# Patient Record
Sex: FEMALE | Race: WHITE | Employment: FULL TIME | URBAN - METROPOLITAN AREA
[De-identification: names, ages, dates, MRNs, and addresses within clinical notes are randomized per-mention and may not be internally consistent; named-entity substitution may affect disease eponyms.]

---

## 2017-03-22 ENCOUNTER — OFFICE VISIT (OUTPATIENT)
Dept: FAMILY MEDICINE CLINIC | Age: 52
End: 2017-03-22

## 2017-03-22 VITALS
WEIGHT: 215 LBS | RESPIRATION RATE: 20 BRPM | DIASTOLIC BLOOD PRESSURE: 66 MMHG | HEIGHT: 68 IN | HEART RATE: 77 BPM | BODY MASS INDEX: 32.58 KG/M2 | SYSTOLIC BLOOD PRESSURE: 102 MMHG | OXYGEN SATURATION: 96 %

## 2017-03-22 DIAGNOSIS — R05.3 CHRONIC COUGH: Primary | ICD-10-CM

## 2017-03-22 DIAGNOSIS — Z11.59 NEED FOR HEPATITIS C SCREENING TEST: ICD-10-CM

## 2017-03-22 DIAGNOSIS — Z80.0 FH: COLON CANCER: ICD-10-CM

## 2017-03-22 DIAGNOSIS — I10 ESSENTIAL HYPERTENSION: ICD-10-CM

## 2017-03-22 DIAGNOSIS — N60.01 BREAST CYST, RIGHT: ICD-10-CM

## 2017-03-22 DIAGNOSIS — F43.21 GRIEF: ICD-10-CM

## 2017-03-22 DIAGNOSIS — Z80.8 FH: MELANOMA: ICD-10-CM

## 2017-03-22 DIAGNOSIS — E66.9 OBESITY (BMI 30.0-34.9): ICD-10-CM

## 2017-03-22 PROBLEM — E66.811 OBESITY (BMI 30.0-34.9): Status: ACTIVE | Noted: 2017-03-22

## 2017-03-22 PROBLEM — E78.00 PURE HYPERCHOLESTEROLEMIA: Status: ACTIVE | Noted: 2017-03-22

## 2017-03-22 PROBLEM — J45.909 ASTHMA: Status: ACTIVE | Noted: 2017-03-22

## 2017-03-22 PROBLEM — E11.9 TYPE 2 DIABETES MELLITUS WITHOUT COMPLICATION, WITHOUT LONG-TERM CURRENT USE OF INSULIN (HCC): Status: ACTIVE | Noted: 2017-03-22

## 2017-03-22 LAB
ANION GAP SERPL CALCULATED.3IONS-SCNC: 13 MMOL/L (ref 3–16)
BASOPHILS ABSOLUTE: 0.1 K/UL (ref 0–0.2)
BASOPHILS RELATIVE PERCENT: 1.3 %
BUN BLDV-MCNC: 13 MG/DL (ref 7–20)
CALCIUM SERPL-MCNC: 9.8 MG/DL (ref 8.3–10.6)
CHLORIDE BLD-SCNC: 100 MMOL/L (ref 99–110)
CHOLESTEROL, TOTAL: 280 MG/DL (ref 0–199)
CO2: 28 MMOL/L (ref 21–32)
CREAT SERPL-MCNC: 0.8 MG/DL (ref 0.6–1.1)
EOSINOPHILS ABSOLUTE: 0.1 K/UL (ref 0–0.6)
EOSINOPHILS RELATIVE PERCENT: 2 %
ESTIMATED AVERAGE GLUCOSE: 105.4 MG/DL
GFR AFRICAN AMERICAN: >60
GFR NON-AFRICAN AMERICAN: >60
GLUCOSE BLD-MCNC: 87 MG/DL (ref 70–99)
HBA1C MFR BLD: 5.3 %
HCT VFR BLD CALC: 43.7 % (ref 36–48)
HDLC SERPL-MCNC: 80 MG/DL (ref 40–60)
HEMOGLOBIN: 14.4 G/DL (ref 12–16)
HEPATITIS C ANTIBODY INTERPRETATION: NORMAL
LDL CHOLESTEROL CALCULATED: 183 MG/DL
LYMPHOCYTES ABSOLUTE: 1.6 K/UL (ref 1–5.1)
LYMPHOCYTES RELATIVE PERCENT: 31.9 %
MCH RBC QN AUTO: 28.7 PG (ref 26–34)
MCHC RBC AUTO-ENTMCNC: 33 G/DL (ref 31–36)
MCV RBC AUTO: 87 FL (ref 80–100)
MONOCYTES ABSOLUTE: 0.5 K/UL (ref 0–1.3)
MONOCYTES RELATIVE PERCENT: 10.7 %
NEUTROPHILS ABSOLUTE: 2.7 K/UL (ref 1.7–7.7)
NEUTROPHILS RELATIVE PERCENT: 54.1 %
PDW BLD-RTO: 12.4 % (ref 12.4–15.4)
PLATELET # BLD: 251 K/UL (ref 135–450)
PMV BLD AUTO: 9.2 FL (ref 5–10.5)
POTASSIUM SERPL-SCNC: 3.7 MMOL/L (ref 3.5–5.1)
RBC # BLD: 5.02 M/UL (ref 4–5.2)
SEDIMENTATION RATE, ERYTHROCYTE: 25 MM/HR (ref 0–30)
SODIUM BLD-SCNC: 141 MMOL/L (ref 136–145)
TRIGL SERPL-MCNC: 85 MG/DL (ref 0–150)
TSH REFLEX FT4: 2.31 UIU/ML (ref 0.27–4.2)
VLDLC SERPL CALC-MCNC: 17 MG/DL
WBC # BLD: 5.1 K/UL (ref 4–11)

## 2017-03-22 PROCEDURE — 99204 OFFICE O/P NEW MOD 45 MIN: CPT | Performed by: INTERNAL MEDICINE

## 2017-03-22 PROCEDURE — 36415 COLL VENOUS BLD VENIPUNCTURE: CPT | Performed by: INTERNAL MEDICINE

## 2017-03-22 RX ORDER — FLUTICASONE PROPIONATE 50 MCG
SPRAY, SUSPENSION (ML) NASAL
Refills: 10 | COMMUNITY
Start: 2017-02-22 | End: 2018-05-22

## 2017-03-22 RX ORDER — MONTELUKAST SODIUM 10 MG/1
10 TABLET ORAL DAILY
Qty: 30 TABLET | Refills: 3 | Status: SHIPPED | OUTPATIENT
Start: 2017-03-22 | End: 2017-06-28 | Stop reason: SDUPTHER

## 2017-03-22 RX ORDER — LISINOPRIL 20 MG/1
TABLET ORAL
Refills: 0 | COMMUNITY
Start: 2017-03-03 | End: 2017-06-02 | Stop reason: SDUPTHER

## 2017-03-22 RX ORDER — PANTOPRAZOLE SODIUM 40 MG/1
TABLET, DELAYED RELEASE ORAL
Refills: 0 | COMMUNITY
Start: 2017-02-22 | End: 2019-11-26

## 2017-03-22 RX ORDER — ESTRADIOL 1 MG/1
TABLET ORAL
Refills: 0 | COMMUNITY
Start: 2017-01-28 | End: 2017-03-22 | Stop reason: SDUPTHER

## 2017-03-22 RX ORDER — TRIAMTERENE AND HYDROCHLOROTHIAZIDE 37.5; 25 MG/1; MG/1
1 TABLET ORAL DAILY
COMMUNITY
End: 2017-03-22 | Stop reason: SDUPTHER

## 2017-03-22 ASSESSMENT — PATIENT HEALTH QUESTIONNAIRE - PHQ9
SUM OF ALL RESPONSES TO PHQ9 QUESTIONS 1 & 2: 1
2. FEELING DOWN, DEPRESSED OR HOPELESS: 1
1. LITTLE INTEREST OR PLEASURE IN DOING THINGS: 0
SUM OF ALL RESPONSES TO PHQ QUESTIONS 1-9: 1

## 2017-03-23 RX ORDER — ESTRADIOL 1 MG/1
TABLET ORAL
Qty: 45 TABLET | Refills: 2 | Status: SHIPPED | OUTPATIENT
Start: 2017-03-23 | End: 2017-12-06 | Stop reason: SDUPTHER

## 2017-03-23 RX ORDER — TRIAMTERENE AND HYDROCHLOROTHIAZIDE 37.5; 25 MG/1; MG/1
TABLET ORAL
Qty: 90 TABLET | Refills: 2 | Status: SHIPPED | OUTPATIENT
Start: 2017-03-23 | End: 2017-12-18 | Stop reason: SDUPTHER

## 2017-04-10 ENCOUNTER — TELEPHONE (OUTPATIENT)
Dept: FAMILY MEDICINE CLINIC | Age: 52
End: 2017-04-10

## 2017-05-01 ENCOUNTER — TELEPHONE (OUTPATIENT)
Dept: FAMILY MEDICINE CLINIC | Age: 52
End: 2017-05-01

## 2017-05-03 ENCOUNTER — TELEPHONE (OUTPATIENT)
Dept: FAMILY MEDICINE CLINIC | Age: 52
End: 2017-05-03

## 2017-05-05 ENCOUNTER — HOSPITAL ENCOUNTER (OUTPATIENT)
Dept: OTHER | Age: 52
Discharge: HOME | End: 2017-05-05
Attending: INTERNAL MEDICINE | Admitting: INTERNAL MEDICINE

## 2017-05-05 DIAGNOSIS — R05.3 CHRONIC COUGH: ICD-10-CM

## 2017-05-18 ENCOUNTER — TELEPHONE (OUTPATIENT)
Dept: FAMILY MEDICINE CLINIC | Age: 52
End: 2017-05-18

## 2017-06-02 ENCOUNTER — PATIENT MESSAGE (OUTPATIENT)
Dept: FAMILY MEDICINE CLINIC | Age: 52
End: 2017-06-02

## 2017-06-02 DIAGNOSIS — E78.00 PURE HYPERCHOLESTEROLEMIA: Primary | ICD-10-CM

## 2017-06-02 RX ORDER — LISINOPRIL 20 MG/1
TABLET ORAL
Qty: 30 TABLET | Refills: 0 | Status: SHIPPED | OUTPATIENT
Start: 2017-06-02 | End: 2017-06-27 | Stop reason: SDUPTHER

## 2017-06-28 RX ORDER — MONTELUKAST SODIUM 10 MG/1
10 TABLET ORAL DAILY
Qty: 90 TABLET | Refills: 1 | Status: SHIPPED | OUTPATIENT
Start: 2017-06-28 | End: 2018-01-05 | Stop reason: SDUPTHER

## 2017-12-06 RX ORDER — ESTRADIOL 1 MG/1
TABLET ORAL
Qty: 45 TABLET | Refills: 0 | Status: SHIPPED | OUTPATIENT
Start: 2017-12-06 | End: 2019-11-26

## 2017-12-18 RX ORDER — TRIAMTERENE AND HYDROCHLOROTHIAZIDE 37.5; 25 MG/1; MG/1
TABLET ORAL
Qty: 90 TABLET | Refills: 2 | Status: SHIPPED | OUTPATIENT
Start: 2017-12-18 | End: 2018-02-13 | Stop reason: SDUPTHER

## 2018-01-06 RX ORDER — MONTELUKAST SODIUM 10 MG/1
10 TABLET ORAL DAILY
Qty: 90 TABLET | Refills: 1 | Status: SHIPPED | OUTPATIENT
Start: 2018-01-06 | End: 2018-02-13 | Stop reason: SDUPTHER

## 2018-02-13 DIAGNOSIS — J45.909 ASTHMA, UNSPECIFIED ASTHMA SEVERITY, UNSPECIFIED WHETHER COMPLICATED, UNSPECIFIED WHETHER PERSISTENT: ICD-10-CM

## 2018-02-13 DIAGNOSIS — I10 ESSENTIAL HYPERTENSION: Primary | ICD-10-CM

## 2018-02-13 RX ORDER — TRIAMTERENE AND HYDROCHLOROTHIAZIDE 37.5; 25 MG/1; MG/1
TABLET ORAL
Qty: 7 TABLET | Refills: 0 | Status: SHIPPED | OUTPATIENT
Start: 2018-02-13 | End: 2018-05-21

## 2018-02-13 RX ORDER — MONTELUKAST SODIUM 10 MG/1
TABLET ORAL
Qty: 7 TABLET | Refills: 0 | Status: SHIPPED | OUTPATIENT
Start: 2018-02-13 | End: 2018-05-23 | Stop reason: SDUPTHER

## 2018-02-13 RX ORDER — MONTELUKAST SODIUM 10 MG/1
10 TABLET ORAL DAILY
Qty: 90 TABLET | Refills: 1 | Status: CANCELLED | OUTPATIENT
Start: 2018-02-13

## 2018-02-13 RX ORDER — LISINOPRIL 20 MG/1
TABLET ORAL
Qty: 7 TABLET | Refills: 0 | Status: SHIPPED | OUTPATIENT
Start: 2018-02-13 | End: 2018-05-21

## 2018-05-10 ENCOUNTER — TELEPHONE (OUTPATIENT)
Dept: FAMILY MEDICINE CLINIC | Age: 53
End: 2018-05-10

## 2018-05-10 PROBLEM — R05.3 CHRONIC COUGH: Status: RESOLVED | Noted: 2017-03-22 | Resolved: 2018-05-10

## 2018-05-22 ENCOUNTER — OFFICE VISIT (OUTPATIENT)
Dept: FAMILY MEDICINE CLINIC | Age: 53
End: 2018-05-22

## 2018-05-22 VITALS
WEIGHT: 219 LBS | DIASTOLIC BLOOD PRESSURE: 80 MMHG | BODY MASS INDEX: 33.19 KG/M2 | HEART RATE: 92 BPM | SYSTOLIC BLOOD PRESSURE: 117 MMHG | RESPIRATION RATE: 16 BRPM | OXYGEN SATURATION: 97 % | HEIGHT: 68 IN

## 2018-05-22 DIAGNOSIS — I10 ESSENTIAL HYPERTENSION: Primary | ICD-10-CM

## 2018-05-22 DIAGNOSIS — Z80.8 FH: MELANOMA: ICD-10-CM

## 2018-05-22 DIAGNOSIS — M54.9 UPPER BACK PAIN: ICD-10-CM

## 2018-05-22 DIAGNOSIS — E78.00 PURE HYPERCHOLESTEROLEMIA: ICD-10-CM

## 2018-05-22 DIAGNOSIS — Z00.01 ENCOUNTER FOR GENERAL ADULT MEDICAL EXAMINATION WITH ABNORMAL FINDINGS: ICD-10-CM

## 2018-05-22 DIAGNOSIS — R05.3 CHRONIC COUGH: ICD-10-CM

## 2018-05-22 DIAGNOSIS — Z80.0 FH: COLON CANCER: ICD-10-CM

## 2018-05-22 LAB
ALBUMIN SERPL-MCNC: 4.5 G/DL (ref 3.4–5)
ALP BLD-CCNC: 80 U/L (ref 40–129)
ALT SERPL-CCNC: 16 U/L (ref 10–40)
ANION GAP SERPL CALCULATED.3IONS-SCNC: 17 MMOL/L (ref 3–16)
AST SERPL-CCNC: 17 U/L (ref 15–37)
BILIRUB SERPL-MCNC: 0.4 MG/DL (ref 0–1)
BILIRUBIN DIRECT: <0.2 MG/DL (ref 0–0.3)
BILIRUBIN, INDIRECT: NORMAL MG/DL (ref 0–1)
BUN BLDV-MCNC: 10 MG/DL (ref 7–20)
C-REACTIVE PROTEIN: 36.9 MG/L (ref 0–5.1)
CALCIUM SERPL-MCNC: 9.7 MG/DL (ref 8.3–10.6)
CHLORIDE BLD-SCNC: 100 MMOL/L (ref 99–110)
CHOLESTEROL, TOTAL: 227 MG/DL (ref 0–199)
CO2: 27 MMOL/L (ref 21–32)
CREAT SERPL-MCNC: 0.7 MG/DL (ref 0.6–1.1)
GFR AFRICAN AMERICAN: >60
GFR NON-AFRICAN AMERICAN: >60
GLUCOSE BLD-MCNC: 100 MG/DL (ref 70–99)
HDLC SERPL-MCNC: 96 MG/DL (ref 40–60)
LDL CHOLESTEROL CALCULATED: 111 MG/DL
POTASSIUM SERPL-SCNC: 3.9 MMOL/L (ref 3.5–5.1)
SODIUM BLD-SCNC: 144 MMOL/L (ref 136–145)
TOTAL PROTEIN: 7.1 G/DL (ref 6.4–8.2)
TRIGL SERPL-MCNC: 102 MG/DL (ref 0–150)
VLDLC SERPL CALC-MCNC: 20 MG/DL

## 2018-05-22 PROCEDURE — 90732 PPSV23 VACC 2 YRS+ SUBQ/IM: CPT | Performed by: INTERNAL MEDICINE

## 2018-05-22 PROCEDURE — 99396 PREV VISIT EST AGE 40-64: CPT | Performed by: INTERNAL MEDICINE

## 2018-05-22 PROCEDURE — 36415 COLL VENOUS BLD VENIPUNCTURE: CPT | Performed by: INTERNAL MEDICINE

## 2018-05-22 PROCEDURE — 90471 IMMUNIZATION ADMIN: CPT | Performed by: INTERNAL MEDICINE

## 2018-05-22 RX ORDER — LOSARTAN POTASSIUM 50 MG/1
TABLET ORAL
Qty: 60 TABLET | Refills: 3 | Status: SHIPPED | OUTPATIENT
Start: 2018-05-22 | End: 2018-10-16 | Stop reason: SDUPTHER

## 2018-05-22 ASSESSMENT — PATIENT HEALTH QUESTIONNAIRE - PHQ9
1. LITTLE INTEREST OR PLEASURE IN DOING THINGS: 0
2. FEELING DOWN, DEPRESSED OR HOPELESS: 0
SUM OF ALL RESPONSES TO PHQ9 QUESTIONS 1 & 2: 0
SUM OF ALL RESPONSES TO PHQ QUESTIONS 1-9: 0

## 2018-05-23 DIAGNOSIS — J45.909 ASTHMA, UNSPECIFIED ASTHMA SEVERITY, UNSPECIFIED WHETHER COMPLICATED, UNSPECIFIED WHETHER PERSISTENT: ICD-10-CM

## 2018-05-23 RX ORDER — MONTELUKAST SODIUM 10 MG/1
TABLET ORAL
Qty: 39 TABLET | Refills: 5 | Status: SHIPPED | OUTPATIENT
Start: 2018-05-23 | End: 2019-01-09 | Stop reason: SDUPTHER

## 2018-06-28 RX ORDER — UMECLIDINIUM 62.5 UG/1
AEROSOL, POWDER ORAL
Qty: 30 EACH | Refills: 0 | Status: SHIPPED | OUTPATIENT
Start: 2018-06-28 | End: 2018-12-11 | Stop reason: SDUPTHER

## 2018-10-15 RX ORDER — TRIAMTERENE AND HYDROCHLOROTHIAZIDE 37.5; 25 MG/1; MG/1
TABLET ORAL
Qty: 90 TABLET | Refills: 2 | OUTPATIENT
Start: 2018-10-15

## 2018-10-16 RX ORDER — LOSARTAN POTASSIUM 50 MG/1
TABLET ORAL
Qty: 90 TABLET | Refills: 1 | Status: SHIPPED | OUTPATIENT
Start: 2018-10-16 | End: 2019-03-05 | Stop reason: SDUPTHER

## 2018-10-16 RX ORDER — TRIAMTERENE AND HYDROCHLOROTHIAZIDE 37.5; 25 MG/1; MG/1
1 CAPSULE ORAL DAILY
Qty: 30 CAPSULE | Refills: 1 | Status: SHIPPED | OUTPATIENT
Start: 2018-10-16 | End: 2018-11-05

## 2018-11-05 RX ORDER — TRIAMTERENE AND HYDROCHLOROTHIAZIDE 37.5; 25 MG/1; MG/1
TABLET ORAL
Qty: 90 TABLET | Refills: 2 | Status: SHIPPED | OUTPATIENT
Start: 2018-11-05 | End: 2019-08-18 | Stop reason: SDUPTHER

## 2018-12-06 ENCOUNTER — OFFICE VISIT (OUTPATIENT)
Dept: DERMATOLOGY | Age: 53
End: 2018-12-06

## 2018-12-06 DIAGNOSIS — D48.9 NEOPLASM OF UNCERTAIN BEHAVIOR: Primary | ICD-10-CM

## 2018-12-06 DIAGNOSIS — L82.1 SEBORRHEIC KERATOSES: ICD-10-CM

## 2018-12-06 DIAGNOSIS — Z80.8 FAMILY HISTORY OF MELANOMA: ICD-10-CM

## 2018-12-06 DIAGNOSIS — Z78.9 NON-TOBACCO USER: ICD-10-CM

## 2018-12-06 DIAGNOSIS — D22.9 MULTIPLE BENIGN MELANOCYTIC NEVI: ICD-10-CM

## 2018-12-06 DIAGNOSIS — L57.8 PHOTOAGING OF SKIN: ICD-10-CM

## 2018-12-06 PROCEDURE — 11101 PR BIOPSY, EACH ADDED LESION: CPT | Performed by: DERMATOLOGY

## 2018-12-06 PROCEDURE — 11100 PR BIOPSY OF SKIN LESION: CPT | Performed by: DERMATOLOGY

## 2018-12-06 PROCEDURE — 99243 OFF/OP CNSLTJ NEW/EST LOW 30: CPT | Performed by: DERMATOLOGY

## 2018-12-10 LAB — DERMATOLOGY PATHOLOGY REPORT: NORMAL

## 2018-12-11 DIAGNOSIS — J45.909 ASTHMA, UNSPECIFIED ASTHMA SEVERITY, UNSPECIFIED WHETHER COMPLICATED, UNSPECIFIED WHETHER PERSISTENT: Primary | ICD-10-CM

## 2018-12-11 RX ORDER — UMECLIDINIUM 62.5 UG/1
AEROSOL, POWDER ORAL
Qty: 30 EACH | Refills: 5 | Status: SHIPPED | OUTPATIENT
Start: 2018-12-11 | End: 2019-11-26

## 2018-12-12 ENCOUNTER — OFFICE VISIT (OUTPATIENT)
Dept: FAMILY MEDICINE CLINIC | Age: 53
End: 2018-12-12

## 2018-12-12 VITALS
HEIGHT: 68 IN | BODY MASS INDEX: 35.46 KG/M2 | OXYGEN SATURATION: 96 % | SYSTOLIC BLOOD PRESSURE: 128 MMHG | RESPIRATION RATE: 16 BRPM | DIASTOLIC BLOOD PRESSURE: 86 MMHG | WEIGHT: 234 LBS | HEART RATE: 86 BPM

## 2018-12-12 DIAGNOSIS — J45.909 ASTHMA, UNSPECIFIED ASTHMA SEVERITY, UNSPECIFIED WHETHER COMPLICATED, UNSPECIFIED WHETHER PERSISTENT: ICD-10-CM

## 2018-12-12 DIAGNOSIS — I10 ESSENTIAL HYPERTENSION: Primary | ICD-10-CM

## 2018-12-12 DIAGNOSIS — E66.9 OBESITY (BMI 30.0-34.9): ICD-10-CM

## 2018-12-12 DIAGNOSIS — E78.00 PURE HYPERCHOLESTEROLEMIA: ICD-10-CM

## 2018-12-12 PROCEDURE — 94640 AIRWAY INHALATION TREATMENT: CPT | Performed by: INTERNAL MEDICINE

## 2018-12-12 PROCEDURE — 99212 OFFICE O/P EST SF 10 MIN: CPT | Performed by: INTERNAL MEDICINE

## 2018-12-12 RX ORDER — ALBUTEROL SULFATE 90 UG/1
2 AEROSOL, METERED RESPIRATORY (INHALATION) 4 TIMES DAILY PRN
Qty: 3 INHALER | Refills: 0 | Status: SHIPPED | OUTPATIENT
Start: 2018-12-12 | End: 2019-09-18 | Stop reason: SDUPTHER

## 2018-12-12 RX ORDER — ALBUTEROL SULFATE 1.25 MG/3ML
1.25 SOLUTION RESPIRATORY (INHALATION) ONCE
Status: CANCELLED | OUTPATIENT
Start: 2018-12-12 | End: 2018-12-12

## 2018-12-12 RX ORDER — ALBUTEROL SULFATE 1.25 MG/3ML
1.25 SOLUTION RESPIRATORY (INHALATION) ONCE
Status: COMPLETED | OUTPATIENT
Start: 2018-12-12 | End: 2018-12-12

## 2018-12-12 RX ORDER — ALBUTEROL SULFATE 1.25 MG/3ML
1 SOLUTION RESPIRATORY (INHALATION) EVERY 6 HOURS PRN
Qty: 360 ML | Refills: 3 | Status: SHIPPED | OUTPATIENT
Start: 2018-12-12 | End: 2019-09-18 | Stop reason: SDUPTHER

## 2018-12-12 RX ADMIN — ALBUTEROL SULFATE 1.25 MG: 1.25 SOLUTION RESPIRATORY (INHALATION) at 10:17

## 2019-01-04 RX ORDER — ONDANSETRON 4 MG/1
4 TABLET, FILM COATED ORAL EVERY 8 HOURS PRN
Qty: 15 TABLET | Refills: 0 | Status: SHIPPED | OUTPATIENT
Start: 2019-01-04 | End: 2019-11-26

## 2019-01-09 DIAGNOSIS — J45.909 ASTHMA, UNSPECIFIED ASTHMA SEVERITY, UNSPECIFIED WHETHER COMPLICATED, UNSPECIFIED WHETHER PERSISTENT: ICD-10-CM

## 2019-01-09 RX ORDER — MONTELUKAST SODIUM 10 MG/1
TABLET ORAL
Qty: 90 TABLET | Refills: 1 | Status: SHIPPED | OUTPATIENT
Start: 2019-01-09 | End: 2019-07-18 | Stop reason: SDUPTHER

## 2019-03-05 RX ORDER — LOSARTAN POTASSIUM 50 MG/1
TABLET ORAL
Qty: 90 TABLET | Refills: 0 | Status: SHIPPED | OUTPATIENT
Start: 2019-03-05 | End: 2019-06-06 | Stop reason: SDUPTHER

## 2019-06-07 RX ORDER — LOSARTAN POTASSIUM 50 MG/1
TABLET ORAL
Qty: 90 TABLET | Refills: 0 | Status: SHIPPED | OUTPATIENT
Start: 2019-06-07 | End: 2019-08-30 | Stop reason: SDUPTHER

## 2019-08-16 DIAGNOSIS — J45.909 ASTHMA, UNSPECIFIED ASTHMA SEVERITY, UNSPECIFIED WHETHER COMPLICATED, UNSPECIFIED WHETHER PERSISTENT: ICD-10-CM

## 2019-08-16 RX ORDER — MONTELUKAST SODIUM 10 MG/1
TABLET ORAL
Qty: 30 TABLET | Refills: 0 | Status: SHIPPED | OUTPATIENT
Start: 2019-08-16 | End: 2019-08-30 | Stop reason: SDUPTHER

## 2019-08-30 DIAGNOSIS — J45.909 ASTHMA, UNSPECIFIED ASTHMA SEVERITY, UNSPECIFIED WHETHER COMPLICATED, UNSPECIFIED WHETHER PERSISTENT: ICD-10-CM

## 2019-08-30 RX ORDER — TRIAMTERENE AND HYDROCHLOROTHIAZIDE 37.5; 25 MG/1; MG/1
TABLET ORAL
Qty: 7 TABLET | Refills: 0 | Status: SHIPPED | OUTPATIENT
Start: 2019-08-30 | End: 2019-09-18 | Stop reason: SDUPTHER

## 2019-08-30 RX ORDER — LOSARTAN POTASSIUM 50 MG/1
TABLET ORAL
Qty: 7 TABLET | Refills: 0 | Status: SHIPPED | OUTPATIENT
Start: 2019-08-30 | End: 2019-09-03 | Stop reason: SDUPTHER

## 2019-08-30 RX ORDER — MONTELUKAST SODIUM 10 MG/1
10 TABLET ORAL NIGHTLY
Qty: 7 TABLET | Refills: 0 | Status: SHIPPED | OUTPATIENT
Start: 2019-08-30 | End: 2019-09-18 | Stop reason: SDUPTHER

## 2019-09-03 DIAGNOSIS — I10 ESSENTIAL HYPERTENSION: Primary | ICD-10-CM

## 2019-09-03 RX ORDER — LOSARTAN POTASSIUM 50 MG/1
TABLET ORAL
Qty: 30 TABLET | Refills: 2 | Status: SHIPPED | OUTPATIENT
Start: 2019-09-03 | End: 2019-09-18 | Stop reason: SDUPTHER

## 2019-09-18 ENCOUNTER — OFFICE VISIT (OUTPATIENT)
Dept: FAMILY MEDICINE CLINIC | Age: 54
End: 2019-09-18
Payer: COMMERCIAL

## 2019-09-18 VITALS
HEIGHT: 67 IN | HEART RATE: 84 BPM | RESPIRATION RATE: 16 BRPM | SYSTOLIC BLOOD PRESSURE: 130 MMHG | OXYGEN SATURATION: 96 % | BODY MASS INDEX: 34.37 KG/M2 | WEIGHT: 219 LBS | DIASTOLIC BLOOD PRESSURE: 70 MMHG

## 2019-09-18 DIAGNOSIS — Z80.8 FH: MELANOMA: ICD-10-CM

## 2019-09-18 DIAGNOSIS — M25.562 CHRONIC PAIN OF LEFT KNEE: ICD-10-CM

## 2019-09-18 DIAGNOSIS — E66.9 OBESITY (BMI 30.0-34.9): ICD-10-CM

## 2019-09-18 DIAGNOSIS — E78.00 PURE HYPERCHOLESTEROLEMIA: ICD-10-CM

## 2019-09-18 DIAGNOSIS — R42 VERTIGO: ICD-10-CM

## 2019-09-18 DIAGNOSIS — Z80.0 FH: COLON CANCER: ICD-10-CM

## 2019-09-18 DIAGNOSIS — Z23 NEED FOR INFLUENZA VACCINATION: ICD-10-CM

## 2019-09-18 DIAGNOSIS — E66.9 OBESITY, CLASS I, BMI 30-34.9: ICD-10-CM

## 2019-09-18 DIAGNOSIS — I10 ESSENTIAL HYPERTENSION: Primary | ICD-10-CM

## 2019-09-18 DIAGNOSIS — J45.909 ASTHMA, UNSPECIFIED ASTHMA SEVERITY, UNSPECIFIED WHETHER COMPLICATED, UNSPECIFIED WHETHER PERSISTENT: ICD-10-CM

## 2019-09-18 DIAGNOSIS — G89.29 CHRONIC PAIN OF LEFT KNEE: ICD-10-CM

## 2019-09-18 LAB
A/G RATIO: 1.8 (ref 1.1–2.2)
ALBUMIN SERPL-MCNC: 4.6 G/DL (ref 3.4–5)
ALP BLD-CCNC: 70 U/L (ref 40–129)
ALT SERPL-CCNC: 25 U/L (ref 10–40)
ANION GAP SERPL CALCULATED.3IONS-SCNC: 15 MMOL/L (ref 3–16)
AST SERPL-CCNC: 22 U/L (ref 15–37)
BILIRUB SERPL-MCNC: 0.3 MG/DL (ref 0–1)
BUN BLDV-MCNC: 22 MG/DL (ref 7–20)
C-REACTIVE PROTEIN: 2.7 MG/L (ref 0–5.1)
CALCIUM SERPL-MCNC: 10.1 MG/DL (ref 8.3–10.6)
CHLORIDE BLD-SCNC: 103 MMOL/L (ref 99–110)
CHOLESTEROL, TOTAL: 239 MG/DL (ref 0–199)
CO2: 27 MMOL/L (ref 21–32)
CREAT SERPL-MCNC: 0.9 MG/DL (ref 0.6–1.1)
GFR AFRICAN AMERICAN: >60
GFR NON-AFRICAN AMERICAN: >60
GLOBULIN: 2.5 G/DL
GLUCOSE BLD-MCNC: 76 MG/DL (ref 70–99)
HDLC SERPL-MCNC: 87 MG/DL (ref 40–60)
LDL CHOLESTEROL CALCULATED: 140 MG/DL
POTASSIUM SERPL-SCNC: 4 MMOL/L (ref 3.5–5.1)
SODIUM BLD-SCNC: 145 MMOL/L (ref 136–145)
TOTAL PROTEIN: 7.1 G/DL (ref 6.4–8.2)
TRIGL SERPL-MCNC: 58 MG/DL (ref 0–150)
VLDLC SERPL CALC-MCNC: 12 MG/DL

## 2019-09-18 PROCEDURE — 99214 OFFICE O/P EST MOD 30 MIN: CPT | Performed by: INTERNAL MEDICINE

## 2019-09-18 PROCEDURE — 92551 PURE TONE HEARING TEST AIR: CPT | Performed by: INTERNAL MEDICINE

## 2019-09-18 PROCEDURE — 90674 CCIIV4 VAC NO PRSV 0.5 ML IM: CPT | Performed by: INTERNAL MEDICINE

## 2019-09-18 PROCEDURE — 90471 IMMUNIZATION ADMIN: CPT | Performed by: INTERNAL MEDICINE

## 2019-09-18 RX ORDER — TRIAMTERENE AND HYDROCHLOROTHIAZIDE 37.5; 25 MG/1; MG/1
TABLET ORAL
Qty: 90 TABLET | Refills: 1 | Status: SHIPPED | OUTPATIENT
Start: 2019-09-18 | End: 2020-03-19

## 2019-09-18 RX ORDER — ALBUTEROL SULFATE 1.25 MG/3ML
1 SOLUTION RESPIRATORY (INHALATION) EVERY 6 HOURS PRN
Qty: 360 ML | Refills: 3 | Status: SHIPPED | OUTPATIENT
Start: 2019-09-18 | End: 2021-12-20

## 2019-09-18 RX ORDER — ALBUTEROL SULFATE 90 UG/1
2 AEROSOL, METERED RESPIRATORY (INHALATION) 4 TIMES DAILY PRN
Qty: 3 INHALER | Refills: 0 | Status: SHIPPED | OUTPATIENT
Start: 2019-09-18 | End: 2021-12-20

## 2019-09-18 RX ORDER — FLUTICASONE FUROATE AND VILANTEROL 200; 25 UG/1; UG/1
POWDER RESPIRATORY (INHALATION)
Qty: 1 EACH | Refills: 5 | Status: SHIPPED | OUTPATIENT
Start: 2019-09-18 | End: 2019-09-24

## 2019-09-18 RX ORDER — MONTELUKAST SODIUM 10 MG/1
10 TABLET ORAL NIGHTLY
Qty: 7 TABLET | Refills: 0 | Status: SHIPPED | OUTPATIENT
Start: 2019-09-18 | End: 2019-11-26

## 2019-09-18 RX ORDER — LOSARTAN POTASSIUM 50 MG/1
TABLET ORAL
Qty: 90 TABLET | Refills: 1 | Status: SHIPPED | OUTPATIENT
Start: 2019-09-18 | End: 2019-12-19

## 2019-09-18 ASSESSMENT — PATIENT HEALTH QUESTIONNAIRE - PHQ9
SUM OF ALL RESPONSES TO PHQ QUESTIONS 1-9: 0
2. FEELING DOWN, DEPRESSED OR HOPELESS: 0
SUM OF ALL RESPONSES TO PHQ QUESTIONS 1-9: 0
SUM OF ALL RESPONSES TO PHQ9 QUESTIONS 1 & 2: 0
1. LITTLE INTEREST OR PLEASURE IN DOING THINGS: 0

## 2019-09-18 NOTE — PROGRESS NOTES
HPI: Caitlyn Graham presents for vertigo, left knee pain, chronic health issues. Chronic health concerns include hypertension, asthma, familial colon cancer, dysplastic nevus,fh melanoma    Over the last several weeks has noted some pain and swelling left knee. Some crepitus. No falls. No trauma. Weight is actually down. BMI of 34. No new activities. Feels like the tenderness snapping and slipping. Sometimes feels like her kneecap is going over an abnormal direction. Right knee is fine. No popliteal cyst or swelling. No erythema. Minimal pain. Symptoms are persistent. More common with activity. For last several years has had intermittent episodes of vertigo. Usually lasting about 2 hours however has had such had 2 episodes lasting several days in duration causing her inability to drive. Chronic tinnitus. No family history of Ménière's. Denies any hearing loss. First episode which was bad was when she was sleeping in an unusual position. She denies any carsickness. No concussions seizures. Does have ocular migraines. No history of stroke or brain scans. No family history of neurologic disorder. No falls. Times are intermittent however worsening in the last year. Hypertension. Dyazide and losartan 50. Does not check her blood pressure. No functional testing. No lower extremity edema exercise intolerance palpitations. A1c 5.3. L1 11 with HDL of 96.      . No abnormal Paps however had thickened endometrium and fibroids. Negative atypia on biopsy. On estrogen and Provera. Mammogram 5-18 normal.  Gets them in [facility]. Tatum Cullen No family history of breast or ovarian cancer no concerns with STDs.      Colonoscopy  with hemorrhoids. Positive colon cancer in mother. Change in bowel pattern     Melanoma in her sister. dysplastic nevus 2018. To follow back up. Does not have a current appointment. No current worrisome lesions. Does use sunscreen. Asthma. Chronic cough. Dispense Refill    losartan (COZAAR) 50 MG tablet TAKE 1 TABLET BY MOUTH EVERY DAY FOR BLOOD PRESSURE 30 tablet 2    triamterene-hydrochlorothiazide (MAXZIDE-25) 37.5-25 MG per tablet TAKE 1 TABLET BY MOUTH EVERY DAY 7 tablet 0    montelukast (SINGULAIR) 10 MG tablet Take 1 tablet by mouth nightly 7 tablet 0    albuterol sulfate  (90 Base) MCG/ACT inhaler Inhale 2 puffs into the lungs 4 times daily as needed for Wheezing 3 Inhaler 0    albuterol (ACCUNEB) 1.25 MG/3ML nebulizer solution Inhale 3 mLs into the lungs every 6 hours as needed for Wheezing 360 mL 3             Past Medical History:   Diagnosis Date    Asthma     Benign cyst of breast     Hypertension     Pure hypercholesterolemia 3/22/2017       Past Surgical History:   Procedure Laterality Date    CYST REMOVAL  2004             Family History   Problem Relation Age of Onset    Colon Cancer Mother     Other Father     Cancer Sister     Diabetes Brother         Melanoma           Review of Systems        Objective     Ht 5' 7\" (1.702 m)   Wt 219 lb (99.3 kg)   BMI 34.30 kg/m²     @LASTSAO2(3)@    Wt Readings from Last 3 Encounters:   09/18/19 219 lb (99.3 kg)   12/12/18 234 lb (106.1 kg)   05/22/18 219 lb (99.3 kg)       Physical Exam     NAD alert and cooperative  HEENT: TMs are unremarkable. Throat is clear slightly small hypopharynx. No stridor. Good upstroke of the carotids. No adenopathy. Increased SANTIAGO ratio. No wheeze. Multiple episodes of coughing. No JVD. Cardiovascular exam regular rate and rhythm without any murmur or click. No S4.  Breasts are pendulous without any masses or discharge. No intertriginous rashes. No hepatosplenomegaly or epigastric tenderness. Good range of motion of the hips. Crepitus of the knees. Left greater than right. No peripheral edema. Good pulses in the feet. No suspicious skin lesions or nodules. Multiple nevi of various colors.       Chemistry        Component Value Date/Time  05/22/2018 1350    K 3.9 05/22/2018 1350     05/22/2018 1350    CO2 27 05/22/2018 1350    BUN 10 05/22/2018 1350    CREATININE 0.7 05/22/2018 1350        Component Value Date/Time    CALCIUM 9.7 05/22/2018 1350    ALKPHOS 80 05/22/2018 1350    AST 17 05/22/2018 1350    ALT 16 05/22/2018 1350    BILITOT 0.4 05/22/2018 1350            Lab Results   Component Value Date    WBC 5.1 03/22/2017    HGB 14.4 03/22/2017    HCT 43.7 03/22/2017    MCV 87.0 03/22/2017     03/22/2017     Lab Results   Component Value Date    LABA1C 5.3 03/22/2017     Lab Results   Component Value Date    .4 03/22/2017     Lab Results   Component Value Date    LABA1C 5.3 03/22/2017     No components found for: CHLPL  Lab Results   Component Value Date    TRIG 102 05/22/2018    TRIG 85 03/22/2017     Lab Results   Component Value Date    HDL 96 (H) 05/22/2018    HDL 80 (H) 03/22/2017     Lab Results   Component Value Date    LDLCALC 111 (H) 05/22/2018    LDLCALC 183 (H) 03/22/2017     Lab Results   Component Value Date    LABVLDL 20 05/22/2018    LABVLDL 17 03/22/2017       Old labs and records reviewed or requested  Discussed past lab and studies with patient        Diagnosis Orders   1. Essential hypertension  Comprehensive Metabolic Panel    losartan (COZAAR) 50 MG tablet   2. Obesity (BMI 30.0-34.9)     3. Pure hypercholesterolemia  Lipid Panel   4. FH: melanoma     5. FH: colon cancer  MA[facility] - Fidencio Sotelo MD, Gastroenterology, [facility]   6. Asthma, unspecified asthma severity, unspecified whether complicated, unspecified whether persistent  montelukast (SINGULAIR) 10 MG tablet   7. Need for influenza vaccination  INFLUENZA, MDCK QUADV, 4 YRS AND OLDER, IM, PF, PREFILL SYR OR SDV, 0.5ML (FLUCELVAX QUADV, PF)   8. Vertigo  C-REACTIVE PROTEIN    88801 - LA PURE TONE HEARING TEST, AIR   9. Chronic pain of left knee  XR KNEE LEFT (3 VIEWS)   10.  Obesity, Class I, BMI 30-34.9       Hypertension good control on second blood pressure. Continue on with current medications. Basic metabolic profile. Lipid profile. A1c. Family history of colon cancer negative colonoscopy 2014 recheck again at this 5-year interval.    Asthma. Off inhalers. Will start Breo. Call in 2 weeks if still coughing we will add in Incruse. Follow-up in office in 1 month. Flu vaccine given. Probable benign positional vertigo. Exercises given for this. Hearing test.  Follow-up ENT if desired. I do not think this is a vertebral migraine. Chondromalacia knee. Film. Continued weight loss. May give exercises after results. Obesity. Continue on with successful weight loss. No follow-ups on file. Diagnosis and treatment discussed.   Possible side effects of medication reviewed  Patients questions answered  Follow up understood  Pt aware if they are not contacted about any test results , this does not mean they are normal.  They should call

## 2019-09-18 NOTE — PATIENT INSTRUCTIONS
triggered it, what helped end it, and any concerns you have about your asthma action plan. Take your diary when you see your doctor. You can then review your asthma action plan and decide if it is working. · Do not smoke or allow others to smoke around you. Avoid smoky places. Smoking makes asthma worse. If you need help quitting, talk to your doctor about stop-smoking programs and medicines. These can increase your chances of quitting for good. · Learn what triggers an asthma attack for you, and avoid the triggers when you can. Common triggers include colds, smoke, air pollution, dust, pollen, mold, pets, cockroaches, stress, and cold air. · Avoid colds and the flu. Get a pneumococcal vaccine shot. If you have had one before, ask your doctor whether you need a second dose. Get a flu vaccine every fall. If you must be around people with colds or the flu, wash your hands often. When should you call for help? Call 911 anytime you think you may need emergency care. For example, call if:    · You have severe trouble breathing.    Call your doctor now or seek immediate medical care if:    · Your symptoms do not get better after you have followed your asthma action plan.     · You cough up yellow, dark brown, or bloody mucus (sputum).    Watch closely for changes in your health, and be sure to contact your doctor if:    · Your coughing and wheezing get worse.     · You need to use quick-relief medicine on more than 2 days a week (unless it is just for exercise).     · You need help figuring out what is triggering your asthma attacks. Where can you learn more? Go to [email or web address] org and sign in to your CloudCover account. Enter P597 in the Adjug box to learn more about \"Asthma in Adults: Care Instructions. \"     If you do not have an account, please click on the \"Sign Up Now\" link. Current as of: September 5, 2018  Content Version: 12.1  © 4833-7853 Healthwise, [employer]. Care instructions adapted under license by [facility] ([facility]). If you have questions about a medical condition or this instruction, always ask your healthcare professional. [address]  any warranty or liability for your use of this information.        call for gyn and mammogram apt in [facility]  Call for derm   Sunscreen  Continue nice weight loss  Xray knee  Follow up colonoscopy  Start breo, if cough not improved in 2 weeks let me know and will add incrus  Follow up 3-4 weeks for breathing test

## 2019-09-19 ENCOUNTER — TELEPHONE (OUTPATIENT)
Dept: FAMILY MEDICINE CLINIC | Age: 54
End: 2019-09-19

## 2019-09-19 NOTE — TELEPHONE ENCOUNTER
PA SUBMITTED VIA CMM FOR Breo Ellipta 200-25MCG/INH aerosol powder    Key: [id number]   PENDING

## 2019-09-24 ENCOUNTER — TELEPHONE (OUTPATIENT)
Dept: FAMILY MEDICINE CLINIC | Age: 54
End: 2019-09-24

## 2019-09-25 ENCOUNTER — TELEPHONE (OUTPATIENT)
Dept: FAMILY MEDICINE CLINIC | Age: 54
End: 2019-09-25

## 2019-09-25 NOTE — TELEPHONE ENCOUNTER
Hannah calling in from [facility] regarding the PT's Medication that is needing a prior Auth.   They are needing to speak to someone regarding a few questions with the Medication      Medication regarding: Currie [employer]

## 2019-10-16 DIAGNOSIS — J45.909 ASTHMA, UNSPECIFIED ASTHMA SEVERITY, UNSPECIFIED WHETHER COMPLICATED, UNSPECIFIED WHETHER PERSISTENT: ICD-10-CM

## 2019-10-17 RX ORDER — MONTELUKAST SODIUM 10 MG/1
TABLET ORAL
Qty: 30 TABLET | Refills: 0 | Status: SHIPPED | OUTPATIENT
Start: 2019-10-17 | End: 2019-11-26

## 2019-12-03 ENCOUNTER — ANESTHESIA EVENT (OUTPATIENT)
Dept: ENDOSCOPY | Age: 54
End: 2019-12-03
Payer: COMMERCIAL

## 2019-12-05 ENCOUNTER — ANESTHESIA (OUTPATIENT)
Dept: ENDOSCOPY | Age: 54
End: 2019-12-05
Payer: COMMERCIAL

## 2019-12-05 ENCOUNTER — HOSPITAL ENCOUNTER (OUTPATIENT)
Age: 54
Setting detail: OUTPATIENT SURGERY
Discharge: HOME | End: 2019-12-05
Attending: INTERNAL MEDICINE | Admitting: INTERNAL MEDICINE
Payer: COMMERCIAL

## 2019-12-05 VITALS
HEART RATE: 74 BPM | HEIGHT: 68 IN | SYSTOLIC BLOOD PRESSURE: 140 MMHG | BODY MASS INDEX: 33.8 KG/M2 | DIASTOLIC BLOOD PRESSURE: 91 MMHG | WEIGHT: 223 LBS | OXYGEN SATURATION: 100 % | TEMPERATURE: 97.2 F | RESPIRATION RATE: 16 BRPM

## 2019-12-05 VITALS — OXYGEN SATURATION: 99 % | DIASTOLIC BLOOD PRESSURE: 79 MMHG | SYSTOLIC BLOOD PRESSURE: 111 MMHG

## 2019-12-05 DIAGNOSIS — Z12.11 SCREEN FOR COLON CANCER: ICD-10-CM

## 2019-12-05 PROCEDURE — 88305 TISSUE EXAM BY PATHOLOGIST: CPT

## 2019-12-05 PROCEDURE — 7100000010 HC PHASE II RECOVERY - FIRST 15 MIN: Performed by: INTERNAL MEDICINE

## 2019-12-05 PROCEDURE — 3609010600 HC COLONOSCOPY POLYPECTOMY SNARE/COLD BIOPSY: Performed by: INTERNAL MEDICINE

## 2019-12-05 PROCEDURE — 2500000003 HC RX 250 WO HCPCS: Performed by: NURSE ANESTHETIST, CERTIFIED REGISTERED

## 2019-12-05 PROCEDURE — 3700000001 HC ADD 15 MINUTES (ANESTHESIA): Performed by: INTERNAL MEDICINE

## 2019-12-05 PROCEDURE — 6360000002 HC RX W HCPCS: Performed by: NURSE ANESTHETIST, CERTIFIED REGISTERED

## 2019-12-05 PROCEDURE — 7100000011 HC PHASE II RECOVERY - ADDTL 15 MIN: Performed by: INTERNAL MEDICINE

## 2019-12-05 PROCEDURE — 2580000003 HC RX 258: Performed by: NURSE ANESTHETIST, CERTIFIED REGISTERED

## 2019-12-05 PROCEDURE — 2709999900 HC NON-CHARGEABLE SUPPLY: Performed by: INTERNAL MEDICINE

## 2019-12-05 PROCEDURE — 3700000000 HC ANESTHESIA ATTENDED CARE: Performed by: INTERNAL MEDICINE

## 2019-12-05 PROCEDURE — 2580000003 HC RX 258: Performed by: ANESTHESIOLOGY

## 2019-12-05 RX ORDER — LIDOCAINE HYDROCHLORIDE 20 MG/ML
INJECTION, SOLUTION EPIDURAL; INFILTRATION; INTRACAUDAL; PERINEURAL PRN
Status: DISCONTINUED | OUTPATIENT
Start: 2019-12-05 | End: 2019-12-05 | Stop reason: SDUPTHER

## 2019-12-05 RX ORDER — SODIUM CHLORIDE 0.9 % (FLUSH) 0.9 %
10 SYRINGE (ML) INJECTION EVERY 12 HOURS SCHEDULED
Status: DISCONTINUED | OUTPATIENT
Start: 2019-12-05 | End: 2019-12-05 | Stop reason: HOSPADM

## 2019-12-05 RX ORDER — SODIUM CHLORIDE 9 MG/ML
INJECTION, SOLUTION INTRAVENOUS CONTINUOUS
Status: DISCONTINUED | OUTPATIENT
Start: 2019-12-05 | End: 2019-12-05 | Stop reason: HOSPADM

## 2019-12-05 RX ORDER — PROPOFOL 10 MG/ML
INJECTION, EMULSION INTRAVENOUS PRN
Status: DISCONTINUED | OUTPATIENT
Start: 2019-12-05 | End: 2019-12-05 | Stop reason: SDUPTHER

## 2019-12-05 RX ORDER — ONDANSETRON 2 MG/ML
4 INJECTION INTRAMUSCULAR; INTRAVENOUS
Status: DISCONTINUED | OUTPATIENT
Start: 2019-12-05 | End: 2019-12-05 | Stop reason: HOSPADM

## 2019-12-05 RX ORDER — SODIUM CHLORIDE 9 MG/ML
INJECTION, SOLUTION INTRAVENOUS CONTINUOUS PRN
Status: DISCONTINUED | OUTPATIENT
Start: 2019-12-05 | End: 2019-12-05 | Stop reason: SDUPTHER

## 2019-12-05 RX ORDER — SODIUM CHLORIDE 0.9 % (FLUSH) 0.9 %
10 SYRINGE (ML) INJECTION PRN
Status: DISCONTINUED | OUTPATIENT
Start: 2019-12-05 | End: 2019-12-05 | Stop reason: HOSPADM

## 2019-12-05 RX ADMIN — PROPOFOL 150 MG: 10 INJECTION, EMULSION INTRAVENOUS at 08:38

## 2019-12-05 RX ADMIN — PROPOFOL 50 MG: 10 INJECTION, EMULSION INTRAVENOUS at 08:46

## 2019-12-05 RX ADMIN — SODIUM CHLORIDE: 0.9 INJECTION, SOLUTION INTRAVENOUS at 07:56

## 2019-12-05 RX ADMIN — PROPOFOL 50 MG: 10 INJECTION, EMULSION INTRAVENOUS at 08:50

## 2019-12-05 RX ADMIN — PROPOFOL 50 MG: 10 INJECTION, EMULSION INTRAVENOUS at 08:42

## 2019-12-05 RX ADMIN — SODIUM CHLORIDE: 9 INJECTION, SOLUTION INTRAVENOUS at 08:35

## 2019-12-05 RX ADMIN — LIDOCAINE HYDROCHLORIDE 50 MG: 20 INJECTION, SOLUTION EPIDURAL; INFILTRATION; INTRACAUDAL; PERINEURAL at 08:38

## 2019-12-05 ASSESSMENT — PAIN SCALES - GENERAL
PAINLEVEL_OUTOF10: 0

## 2019-12-05 ASSESSMENT — PAIN - FUNCTIONAL ASSESSMENT: PAIN_FUNCTIONAL_ASSESSMENT: 0-10

## 2019-12-06 PROBLEM — Z86.0101 HISTORY OF ADENOMATOUS POLYP OF COLON: Status: ACTIVE | Noted: 2019-12-06

## 2019-12-06 PROBLEM — Z86.010 HISTORY OF ADENOMATOUS POLYP OF COLON: Status: ACTIVE | Noted: 2019-12-06

## 2019-12-16 DIAGNOSIS — J45.909 ASTHMA, UNSPECIFIED ASTHMA SEVERITY, UNSPECIFIED WHETHER COMPLICATED, UNSPECIFIED WHETHER PERSISTENT: ICD-10-CM

## 2019-12-16 RX ORDER — MONTELUKAST SODIUM 10 MG/1
TABLET ORAL
Qty: 30 TABLET | Refills: 5 | Status: SHIPPED | OUTPATIENT
Start: 2019-12-16 | End: 2020-06-16

## 2019-12-19 DIAGNOSIS — I10 ESSENTIAL HYPERTENSION: ICD-10-CM

## 2019-12-19 RX ORDER — LOSARTAN POTASSIUM 25 MG/1
TABLET ORAL
Qty: 180 TABLET | Refills: 0 | Status: SHIPPED | OUTPATIENT
Start: 2019-12-19 | End: 2020-07-29

## 2019-12-19 RX ORDER — LOSARTAN POTASSIUM 25 MG/1
TABLET ORAL
Qty: 180 TABLET | Refills: 0 | Status: CANCELLED | OUTPATIENT
Start: 2019-12-19

## 2019-12-26 ENCOUNTER — TELEPHONE (OUTPATIENT)
Dept: FAMILY MEDICINE CLINIC | Age: 54
End: 2019-12-26

## 2020-03-19 RX ORDER — TRIAMTERENE AND HYDROCHLOROTHIAZIDE 37.5; 25 MG/1; MG/1
TABLET ORAL
Qty: 30 TABLET | Refills: 5 | Status: SHIPPED | OUTPATIENT
Start: 2020-03-19 | End: 2020-10-02

## 2020-07-29 RX ORDER — LOSARTAN POTASSIUM 50 MG/1
TABLET ORAL
Qty: 30 TABLET | Refills: 0 | Status: SHIPPED | OUTPATIENT
Start: 2020-07-29 | End: 2020-08-24

## 2020-08-19 ENCOUNTER — OFFICE VISIT (OUTPATIENT)
Dept: FAMILY MEDICINE CLINIC | Age: 55
End: 2020-08-19
Payer: COMMERCIAL

## 2020-08-19 VITALS
HEART RATE: 80 BPM | BODY MASS INDEX: 34.71 KG/M2 | HEIGHT: 68 IN | OXYGEN SATURATION: 97 % | WEIGHT: 229 LBS | SYSTOLIC BLOOD PRESSURE: 132 MMHG | RESPIRATION RATE: 18 BRPM | DIASTOLIC BLOOD PRESSURE: 84 MMHG

## 2020-08-19 LAB
ANION GAP SERPL CALCULATED.3IONS-SCNC: 12 MMOL/L (ref 3–16)
BUN BLDV-MCNC: 13 MG/DL (ref 7–20)
CALCIUM SERPL-MCNC: 9.5 MG/DL (ref 8.3–10.6)
CHLORIDE BLD-SCNC: 100 MMOL/L (ref 99–110)
CHOLESTEROL, FASTING: 267 MG/DL (ref 0–199)
CO2: 28 MMOL/L (ref 21–32)
CREAT SERPL-MCNC: 0.8 MG/DL (ref 0.6–1.1)
GFR AFRICAN AMERICAN: >60
GFR NON-AFRICAN AMERICAN: >60
GLUCOSE BLD-MCNC: 92 MG/DL (ref 70–99)
HDLC SERPL-MCNC: 83 MG/DL (ref 40–60)
LDL CHOLESTEROL CALCULATED: 164 MG/DL
POTASSIUM SERPL-SCNC: 3.8 MMOL/L (ref 3.5–5.1)
SODIUM BLD-SCNC: 140 MMOL/L (ref 136–145)
TRIGLYCERIDE, FASTING: 102 MG/DL (ref 0–150)
VLDLC SERPL CALC-MCNC: 20 MG/DL

## 2020-08-19 PROCEDURE — 99214 OFFICE O/P EST MOD 30 MIN: CPT | Performed by: INTERNAL MEDICINE

## 2020-08-19 ASSESSMENT — PATIENT HEALTH QUESTIONNAIRE - PHQ9
SUM OF ALL RESPONSES TO PHQ QUESTIONS 1-9: 0
1. LITTLE INTEREST OR PLEASURE IN DOING THINGS: 0
SUM OF ALL RESPONSES TO PHQ9 QUESTIONS 1 & 2: 0
SUM OF ALL RESPONSES TO PHQ QUESTIONS 1-9: 0
2. FEELING DOWN, DEPRESSED OR HOPELESS: 0

## 2020-08-19 NOTE — PATIENT INSTRUCTIONS
Call for ent and dermatology appointment  30 min exercise dailyl  Continue decreased calorie diet   Consider shingles vaccine  Call for mammogram

## 2020-08-19 NOTE — PROGRESS NOTES
HPI: Daysi Lamb presents for follow up     Chronic health concerns include hypertension, asthma, familial colon cancer, dysplastic nevus,fh melanoma    Chronic cough work-up 10/2009 with with chest x-ray, PFTs, laryngoscopy, and CT chest.. Right posterior tickle with Chronic cough. Normal CT chest.  CT sinuses and allergy testing negative. Remote ENT for thyroiditis. Positive goiter. .  No productive sputum. Has had a diagnosis of asthma in the past.  Feels like it has not been improved with using Advair albuterol Flonase Zyrtec. Does have some GE reflux. Weight is up. Feels like there is something in the back of her throat. And she has some rhinorrhea and coughing episode. + flonase, omeprazole,      L knee pain intermittently. Doing relatively well now. BMI is elevated. Vertigo not an issue at this time. Chronic tinnitus. History ocular migraines.      Hypertension. Dyazide and losartan 50. Does not check her blood pressure. No functional testing. No lower extremity edema exercise intolerance palpitations. A1c 5.3. L1 11 with HDL of 96.      . No abnormal Paps however had thickened endometrium and fibroids. Negative atypia on biopsy. On estrogen and Provera. .  Gets them in [facility]. Jordan Mccoy family history of breast or ovarian cancer no concerns with STDs. adenomatous polyp . mother colon cancer. Recheck  + hemorrhoids       Melanoma in her sister. dysplastic nevus 2018 + sunscreen Ayala       Chronic cough. Normal CT chest. Diagnosis of asthma. Negative CT sinus and allergy testing. Elevated CRP + nebulizer machine at home. Denies JHON        BMI 34. Down almost 20 pounds. Has cut back on calories and exercising. Decreased alcohol. Denies any GE reflux positive knee pain no prediabetes. Mild elevation in LDL.     PMH:    Uterine cyst    No pregnancy   endometrial biopsy  colonosopyc     NKDA     SH: lives alone. No tobacco. Wine 2 glasses nightly wine .  Using fit bit. 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 Passed away 3 years ago and pt with grief.      FH: 2 siblings    + colon mom 66's ,  MI, melanoma      - mental illness SA      ROS:    Dry skin, chronic cough, glasses. Rare snoring. Breast cysts. Denies JHON. Hx blood in stool and neg coloncocopy 2014 other than hemorrhoids. ., + knee pain, no edema, stones, positive amenorrhea. + grief. No skin cancers. Occasional sunburn. Wears a seatbelt. Denies any depression or anxiety currently. Has a smoke alarm and fire extinguisher. Date with Pap and mammograms. No recurrent urinary tract infections or urine concerns. No STDs. Amenorrheic. Hot flashes. No chronic postnasal drip.   Constitutional, ent, CV, respiratory, GI, , joint, skin, allergic and psychiatric ROS reviewed and negative except for above    Allergies   Allergen Reactions    Sammy Flavor        Outpatient Medications Marked as Taking for the 8/19/20 encounter (Office Visit) with Mihaela Arana MD   Medication Sig Dispense Refill    losartan (COZAAR) 50 MG tablet TAKE 1 TABLET BY MOUTH DAILY 30 tablet 0    triamterene-hydrochlorothiazide (MAXZIDE-25) 37.5-25 MG per tablet TAKE 1 TABLET BY MOUTH EVERY DAY 30 tablet 5    albuterol sulfate  (90 Base) MCG/ACT inhaler Inhale 2 puffs into the lungs 4 times daily as needed for Wheezing 3 Inhaler 0    albuterol (ACCUNEB) 1.25 MG/3ML nebulizer solution Inhale 3 mLs into the lungs every 6 hours as needed for Wheezing 360 mL 3             Past Medical History:   Diagnosis Date    Asthma     Benign cyst of breast     Hypertension        Past Surgical History:   Procedure Laterality Date    COLONOSCOPY  12/2019    Dr. Melo Alex N/A 12/5/2019    COLONOSCOPY POLYPECTOMY SNARE/COLD BIOPSY performed by Sylvia Garcias MD at [address]  2004             Family History   Problem Relation Age of Onset    Colon Cancer Mother     Other Father     Cancer Sister     Diabetes Brother         Melanoma           Review of Systems        Objective     /84   Pulse 80   Resp 18   Ht 5' 8\" (1.727 m)   Wt 229 lb (103.9 kg)   SpO2 97% Comment: RA  BMI 34.82 kg/m²     @LASTSAO2(3)@    Wt Readings from Last 3 Encounters:   12/05/19 223 lb (101.2 kg)   09/18/19 219 lb (99.3 kg)   12/12/18 234 lb (106.1 kg)       Physical Exam     NAD alert and cooperative  HEENT: TMs unremarkable. Tonsils present. Moist mucous membranes. I see no pharyngeal abnormality. Slightly slightly crowded hypopharynx. Positive gag. No adenopathy. Positive thyroid enlargement. Intermittent dry cough  Lungs decreased breath sounds. No wheezes rales or rhonchi  Cardiovascular exam I detect no murmur click. Regular rate and rhythm. Positive obesity no hepatosplenomegaly or epigastric tenderness. No abdominal mass. No peripheral edema. Crepitus left knee. Sun damage skin. Multiple nevi nonsuspicious. Breast no dominant masses. Nodular.   No dimpling or nipple discharge      Chemistry        Component Value Date/Time     09/18/2019 0945    K 4.0 09/18/2019 0945     09/18/2019 0945    CO2 27 09/18/2019 0945    BUN 22 (H) 09/18/2019 0945    CREATININE 0.9 09/18/2019 0945        Component Value Date/Time    CALCIUM 10.1 09/18/2019 0945    ALKPHOS 70 09/18/2019 0945    AST 22 09/18/2019 0945    ALT 25 09/18/2019 0945    BILITOT 0.3 09/18/2019 0945            Lab Results   Component Value Date    WBC 5.1 03/22/2017    HGB 14.4 03/22/2017    HCT 43.7 03/22/2017    MCV 87.0 03/22/2017     03/22/2017     Lab Results   Component Value Date    LABA1C 5.3 03/22/2017     Lab Results   Component Value Date    .4 03/22/2017     Lab Results   Component Value Date    LABA1C 5.3 03/22/2017     No components found for: CHLPL  Lab Results   Component Value Date    TRIG 58 09/18/2019    TRIG 102 05/22/2018    TRIG 85 03/22/2017     Lab Results   Component Value Date    HDL 87 (H) 09/18/2019    HDL 96 (H) 05/22/2018    HDL 80 (H) 03/22/2017     Lab Results   Component Value Date    LDLCALC 140 (H) 09/18/2019    LDLCALC 111 (H) 05/22/2018    LDLCALC 183 (H) 03/22/2017     Lab Results   Component Value Date    LABVLDL 12 09/18/2019    LABVLDL 20 05/22/2018    LABVLDL 17 03/22/2017       Old labs and records reviewed or requested  Discussed past lab and studies with patient      Diagnosis Orders   1. Essential hypertension  Basic Metabolic Panel   2. History of adenomatous polyp of colon     3. FH: melanoma     4. Asthma, unspecified asthma severity, unspecified whether complicated, unspecified whether persistent     5. Pure hypercholesterolemia  Lipid, Fasting   6. Screening for breast cancer  RUBEN DIGITAL SCREEN W OR WO CAD BILATERAL   7. Chronic cough  Solomon Graham MD, Otolaryngology, [facility]     Hypertension good control on current medications will get her basic metabolic profile. History of adenomatous polyp repeat in 2022. Graph family history of melanoma. Sunscreen follow back up with Dr. Yetta Nageotte yearly exams. Diagnosis of asthma chronic cough. States that medications have not been of benefit. Will increase her PPI follow-up with ENT. Try to taper some of her medicines. Hyperlipidemia lipid profile. Breast cancer screening. Mammogram ordered. Obesity. Is starting a low calorie diet. No follow-ups on file. Diagnosis and treatment discussed.   Possible side effects of medication reviewed  Patients questions answered  Follow up understood  Pt aware if they are not contacted about any test results , this does not mean they are normal.  They should call

## 2020-08-24 RX ORDER — LOSARTAN POTASSIUM 50 MG/1
TABLET ORAL
Qty: 30 TABLET | Refills: 0 | Status: SHIPPED | OUTPATIENT
Start: 2020-08-24 | End: 2020-09-18

## 2020-09-18 RX ORDER — LOSARTAN POTASSIUM 50 MG/1
TABLET ORAL
Qty: 30 TABLET | Refills: 0 | Status: SHIPPED | OUTPATIENT
Start: 2020-09-18 | End: 2020-10-19

## 2020-10-02 RX ORDER — TRIAMTERENE AND HYDROCHLOROTHIAZIDE 37.5; 25 MG/1; MG/1
TABLET ORAL
Qty: 30 TABLET | Refills: 5 | Status: SHIPPED | OUTPATIENT
Start: 2020-10-02 | End: 2021-02-18 | Stop reason: SDUPTHER

## 2020-11-02 ENCOUNTER — OFFICE VISIT (OUTPATIENT)
Dept: ENT CLINIC | Age: 55
End: 2020-11-02
Payer: COMMERCIAL

## 2020-11-02 VITALS
WEIGHT: 231 LBS | HEART RATE: 65 BPM | TEMPERATURE: 96.8 F | BODY MASS INDEX: 35.12 KG/M2 | SYSTOLIC BLOOD PRESSURE: 149 MMHG | DIASTOLIC BLOOD PRESSURE: 92 MMHG

## 2020-11-02 PROCEDURE — 31575 DIAGNOSTIC LARYNGOSCOPY: CPT | Performed by: OTOLARYNGOLOGY

## 2020-11-02 PROCEDURE — 99203 OFFICE O/P NEW LOW 30 MIN: CPT | Performed by: OTOLARYNGOLOGY

## 2020-11-02 NOTE — PROGRESS NOTES
[facility]      Patient Name: Hosea Pina  Medical Record Number:  <[id number]  Primary Care Physician:  Radha Louise MD  Date of Consultation: 11/2/2020    Chief Complaint: Chronic cough        HISTORY OF PRESENT ILLNESS  Wanda Dutta is a(n) 60 y.o. female who presents for evaluation of chronic cough. Patient says that she has had her on a 10-year history of chronic cough. She also constantly clears her throat. She has been diagnosed with asthma and has been on inhalers for this. She also has a history of allergic rhinitis and is on Singulair and Flonase for this. She also has acid reflux and takes over-the-counter Prilosec for quite some time. She said none of this has stopped the cough or the throat clearing. She is not a smoker, nor has she been. She denies concerning symptoms such as coughing up blood, trouble swallowing or new cervical neck masses. She has a family history of thyroid issues, but not thyroid cancer. No history of head and neck cancer. Patient also complains of mouth breathing at night which she feels as though it dries out her mouth. She thinks that this might be partially responsible for the feeling. She also says that she snores at night, but is unsure if she has pauses in her breathing. Patient Active Problem List   Diagnosis    Hypertension    Asthma    FH: colon cancer    FH: melanoma    Obesity (BMI 30.0-34. 9)    Grief    Pure hypercholesterolemia    History of adenomatous polyp of colon     Past Surgical History:   Procedure Laterality Date    COLONOSCOPY  12/2019    Dr. Boom Pack    COLONOSCOPY N/A 12/5/2019    COLONOSCOPY POLYPECTOMY SNARE/COLD BIOPSY performed by Michelle Greenwood MD at [address] [place] [address]  2004     Family History   Problem Relation Age of Onset    Colon Cancer Mother     Other Father     Cancer Sister     Diabetes Brother         Melanoma     Social History     Socioeconomic History    Marital status:      Spouse name: Not on file    Number of children: Not on file    Years of education: Not on file    Highest education level: Not on file   Occupational History    Not on file   Social Needs    Financial resource strain: Not on file    Food insecurity     Worry: Not on file     Inability: Not on file    Transportation needs     Medical: Not on file     Non-medical: Not on file   Tobacco Use    Smoking status: Never Smoker    Smokeless tobacco: Never Used   Substance and Sexual Activity    Alcohol use: Yes     Comment: 1 wine daily    Drug use: No    Sexual activity: Not Currently     Partners: Male   Lifestyle    Physical activity     Days per week: Not on file     Minutes per session: Not on file    Stress: Not on file   Relationships    Social connections     Talks on phone: Not on file     Gets together: Not on file     Attends Caodaism service: Not on file     Active member of club or organization: Not on file     Attends meetings of clubs or organizations: Not on file     Relationship status: Not on file    Intimate partner violence     Fear of current or ex partner: Not on file     Emotionally abused: Not on file     Physically abused: Not on file     Forced sexual activity: Not on file   Other Topics Concern    Not on file   Social History Narrative    Not on file       DRUG/FOOD ALLERGIES: [place] flavor    CURRENT MEDICATIONS  Prior to Admission medications    Medication Sig Start Date End Date Taking?  Authorizing Provider   montelukast (SINGULAIR) 10 MG tablet TAKE 1 TABLET BY MOUTH EVERY DAY *PAST DUE 6 MONTH FOLLOW UP* 10/22/20   Lisandro Boswell MD   losartan (COZAAR) 50 MG tablet TAKE 1 TABLET BY MOUTH EVERY DAY 10/19/20   Lisandro Boswell MD   triamterene-hydroCHLOROthiazide (MAXZIDE-25) 37.5-25 MG per tablet TAKE 1 TABLET BY MOUTH EVERY DAY 10/2/20   Lisandro Boswell MD   DULERA 200-5 MCG/ACT inhaler TAKE 2 PUFFS BY MOUTH TWICE A DAY  Patient not taking: Reported on 8/19/2020 5/18/20   Taina Siddiqui MD   zoster recombinant adjuvanted vaccine [facility]) 50 MCG/0.5ML SUSR injection Inject 0.5 mLs into the muscle See Admin Instructions 1 dose now and repeat in 2-6 months 9/18/19   Taina Siddiqui MD   albuterol sulfate  (90 Base) MCG/ACT inhaler Inhale 2 puffs into the lungs 4 times daily as needed for Wheezing 9/18/19   Taina Siddiqui MD   albuterol (ACCUNEB) 1.25 MG/3ML nebulizer solution Inhale 3 mLs into the lungs every 6 hours as needed for Wheezing 9/18/19   Taina Siddiqui MD       REVIEW OF SYSTEMS  The following systems were reviewed and revealed the following in addition to any already discussed in the HPI:    CONSTITUTIONAL: no weight loss, no fever, no night sweats, no chills  EYES: no vision changes, no blurry vision  EARS: no changes in hearing, no otalgia  NOSE: Allergic rhinitis  RESPIRATORY: Chronic cough  CV: no chest pain, no Peripheral vascular disease  HEME: No coagulation disorder, no Bleeding disorder  NEURO: no TIA or stroke-like symptoms  SKIN: No new rashes in the head and neck, no recent skin cancers  MOUTH: No new ulcers, no recent teeth infections  GASTROINTESTINAL: Constant throat clear  PSYCH: No anxiety, no depression      PHYSICAL EXAM  BP (!) 149/92 (Site: Right Upper Arm, Position: Sitting, Cuff Size: Large Adult)   Pulse 65   Temp 96.8 °F (36 °C) (Temporal)   Wt 231 lb (104.8 kg)   BMI 35.12 kg/m²     GENERAL: No Acute Distress, Alert and Oriented, no Hoarseness, strong voice  EYES: EOMI, Anti-icteric  HENT:   Head: Normocephalic and atraumatic.    Face:  Symmetric, facial nerve intact, no sinus tenderness  Right Ear: Normal external ear, normal external auditory canal, intact tympanic membrane with normal mobility and aerated middle ear  Left Ear: Normal external ear, normal external auditory canal, intact tympanic membrane with normal mobility and aerated middle ear  Mouth/Oral Cavity:  normal lips, Uvula is midline, no mucosal lesions, no trismus, normal dentition, normal salivary quality/flow  Oropharynx/Larynx:  normal oropharynx, normal tonsils; normal larynx/nasopharynx on mirror exam  Nose:Normal external nasal appearance. Anterior rhinoscopy shows a mild rightward deviated septum. Relatively large turbinates. Normal mucosa   NECK: Normal range of motion, no thyromegaly, trachea is midline, no lymphadenopathy, no neck masses, no crepitus  CHEST: Normal respiratory effort, no retractions, breathing comfortably  SKIN: No rashes, normal appearing skin, no evidence of skin lesions/tumors  Neuro:  cranial nerve II-XII intact; normal gait  Cardio:  no edema    PROCEDURE  Flexible laryngoscopy  Afrin was applied the bilateral nasal cavity. Flexible scope was passed to left nasal cavity. The nasopharynx was normal.  Base of tongue vallecula normal.  The patient had a bit of edema in the supraglottis consistent with acid reflux. Normal vocal cords        ASSESSMENT/PLAN  1. Laryngopharyngeal reflux (LPR)  Patient has chronic cough. The most common causes include asthma allergy and acid reflux. I feel as though this patient's likely etiology would be acid reflux given the constant throat clearing. Refer to gastroenterology for further evaluation given that she is already on a reflux medication.  - MAHSA Hirsch MD, Gastroenterology, [facility]    2. Globus hystericus  As above    If this does not improve with management by gastroenterology, would have to consider speech therapy for habitual cough. - Nelli Eubanks MD, Gastroenterology, [facility]    3. Allergic rhinitis, unspecified seasonality, unspecified trigger  Continue the Flonase and Singulair. 4. Sleep disorder breathing  Patient describes some symptoms consistent with sleep apnea.   I referred her to sleep medicine for further evaluation as certainly sleep apnea could

## 2020-11-17 ENCOUNTER — TELEPHONE (OUTPATIENT)
Dept: FAMILY MEDICINE CLINIC | Age: 55
End: 2020-11-17

## 2020-11-17 RX ORDER — AMLODIPINE BESYLATE 10 MG/1
10 TABLET ORAL DAILY
Qty: 30 TABLET | Refills: 1 | Status: SHIPPED | OUTPATIENT
Start: 2020-11-17 | End: 2020-12-14

## 2020-11-17 NOTE — TELEPHONE ENCOUNTER
I see the ENT felt you would benefit from a sleep studa and wanted you to follow up with the GI doc for acid reflux despite use of stomach medication. The GI doc wanted me to consider switching your BP medication for losartan to another. I have ordered amlodipine to substitute for losartan. Rarely dose losartan contribute to cough, but reasonable.   If ok I will order this substitution    Back to med

## 2020-12-14 RX ORDER — AMLODIPINE BESYLATE 10 MG/1
10 TABLET ORAL DAILY
Qty: 30 TABLET | Refills: 1 | Status: SHIPPED | OUTPATIENT
Start: 2020-12-14 | End: 2021-01-28 | Stop reason: SDUPTHER

## 2021-01-11 RX ORDER — AMLODIPINE BESYLATE 10 MG/1
10 TABLET ORAL DAILY
Qty: 30 TABLET | Refills: 1 | OUTPATIENT
Start: 2021-01-11

## 2021-01-27 NOTE — TELEPHONE ENCOUNTER
Requesting refill amlodipine. Please call into [facility] at [phone].  Pt is reachable at  [phone]

## 2021-01-28 RX ORDER — AMLODIPINE BESYLATE 10 MG/1
10 TABLET ORAL DAILY
Qty: 30 TABLET | Refills: 1 | Status: SHIPPED | OUTPATIENT
Start: 2021-01-28 | End: 2021-02-18 | Stop reason: SDUPTHER

## 2021-02-03 LAB — PAP SMEAR, EXTERNAL: NORMAL

## 2021-02-18 ENCOUNTER — OFFICE VISIT (OUTPATIENT)
Dept: FAMILY MEDICINE CLINIC | Age: 56
End: 2021-02-18
Payer: COMMERCIAL

## 2021-02-18 VITALS
OXYGEN SATURATION: 98 % | RESPIRATION RATE: 16 BRPM | DIASTOLIC BLOOD PRESSURE: 87 MMHG | BODY MASS INDEX: 33.95 KG/M2 | HEIGHT: 68 IN | TEMPERATURE: 97 F | HEART RATE: 98 BPM | WEIGHT: 224 LBS | SYSTOLIC BLOOD PRESSURE: 121 MMHG

## 2021-02-18 DIAGNOSIS — M25.521 RIGHT ELBOW PAIN: ICD-10-CM

## 2021-02-18 DIAGNOSIS — E78.00 PURE HYPERCHOLESTEROLEMIA: ICD-10-CM

## 2021-02-18 DIAGNOSIS — E66.9 OBESITY (BMI 30.0-34.9): ICD-10-CM

## 2021-02-18 DIAGNOSIS — Z23 NEED FOR INFLUENZA VACCINATION: ICD-10-CM

## 2021-02-18 DIAGNOSIS — Z86.010 HISTORY OF ADENOMATOUS POLYP OF COLON: ICD-10-CM

## 2021-02-18 DIAGNOSIS — R05.3 CHRONIC COUGH: Primary | ICD-10-CM

## 2021-02-18 DIAGNOSIS — I10 ESSENTIAL HYPERTENSION: ICD-10-CM

## 2021-02-18 LAB
A/G RATIO: 1.6 (ref 1.1–2.2)
ALBUMIN SERPL-MCNC: 4.7 G/DL (ref 3.4–5)
ALP BLD-CCNC: 82 U/L (ref 40–129)
ALT SERPL-CCNC: 29 U/L (ref 10–40)
ANION GAP SERPL CALCULATED.3IONS-SCNC: 12 MMOL/L (ref 3–16)
AST SERPL-CCNC: 28 U/L (ref 15–37)
BILIRUB SERPL-MCNC: 0.5 MG/DL (ref 0–1)
BUN BLDV-MCNC: 11 MG/DL (ref 7–20)
C-REACTIVE PROTEIN: 6.2 MG/L (ref 0–5.1)
CALCIUM SERPL-MCNC: 10 MG/DL (ref 8.3–10.6)
CHLORIDE BLD-SCNC: 96 MMOL/L (ref 99–110)
CHOLESTEROL, TOTAL: 305 MG/DL (ref 0–199)
CO2: 29 MMOL/L (ref 21–32)
CREAT SERPL-MCNC: 0.9 MG/DL (ref 0.6–1.1)
GFR AFRICAN AMERICAN: >60
GFR NON-AFRICAN AMERICAN: >60
GLOBULIN: 2.9 G/DL
GLUCOSE BLD-MCNC: 106 MG/DL (ref 70–99)
HDLC SERPL-MCNC: 96 MG/DL (ref 40–60)
LDL CHOLESTEROL CALCULATED: 187 MG/DL
POTASSIUM SERPL-SCNC: 3.5 MMOL/L (ref 3.5–5.1)
SODIUM BLD-SCNC: 137 MMOL/L (ref 136–145)
TOTAL PROTEIN: 7.6 G/DL (ref 6.4–8.2)
TRIGL SERPL-MCNC: 109 MG/DL (ref 0–150)
VLDLC SERPL CALC-MCNC: 22 MG/DL

## 2021-02-18 PROCEDURE — 90471 IMMUNIZATION ADMIN: CPT | Performed by: INTERNAL MEDICINE

## 2021-02-18 PROCEDURE — 99214 OFFICE O/P EST MOD 30 MIN: CPT | Performed by: INTERNAL MEDICINE

## 2021-02-18 PROCEDURE — 90686 IIV4 VACC NO PRSV 0.5 ML IM: CPT | Performed by: INTERNAL MEDICINE

## 2021-02-18 RX ORDER — MONTELUKAST SODIUM 10 MG/1
TABLET ORAL
Qty: 90 TABLET | Refills: 0 | Status: SHIPPED | OUTPATIENT
Start: 2021-02-18 | End: 2021-05-17

## 2021-02-18 RX ORDER — ESTRADIOL 1 MG/1
TABLET ORAL
COMMUNITY
Start: 2021-02-04 | End: 2021-12-20

## 2021-02-18 RX ORDER — TRIAMTERENE AND HYDROCHLOROTHIAZIDE 37.5; 25 MG/1; MG/1
TABLET ORAL
Qty: 90 TABLET | Refills: 1 | Status: SHIPPED | OUTPATIENT
Start: 2021-02-18 | End: 2021-09-08

## 2021-02-18 RX ORDER — BENZONATATE 100 MG/1
CAPSULE ORAL
Qty: 90 CAPSULE | Refills: 0 | Status: SHIPPED | OUTPATIENT
Start: 2021-02-18 | End: 2021-04-12

## 2021-02-18 RX ORDER — AMLODIPINE BESYLATE 10 MG/1
10 TABLET ORAL DAILY
Qty: 90 TABLET | Refills: 1 | Status: SHIPPED | OUTPATIENT
Start: 2021-02-18 | End: 2021-08-11

## 2021-02-18 ASSESSMENT — PATIENT HEALTH QUESTIONNAIRE - PHQ9
1. LITTLE INTEREST OR PLEASURE IN DOING THINGS: 0
SUM OF ALL RESPONSES TO PHQ9 QUESTIONS 1 & 2: 0

## 2021-02-18 NOTE — PROGRESS NOTES
HPI: Martin Vail presents for 6 month follow up    Chronic health concerns include hypertension, asthma, familial colon cancer, dysplastic nevus,fh melanoma    Right elbow pain 2 months. ago contusion. Elbow pain. Hand tingling. States tingling of all fingers. Weakness is improved. It is positional in nature. No left elbow issues. No neck issues. Chronic cough. Work-up 10/2009 with normal chest x-ray, CT chest, laryngoscopy and pulmonary function tests. Negative allergy testing. Remote ENT evaluation for thyroiditis and goiter. Diagnosis of asthma however not improved with albuterol and Advair discontinued. Does use Singulair and nasal spray on occasion. PPI twice daily. ENT evaluation 2020 unremarkable laryngoscope. GI consult felt most likely not GI reflux as continued despite using twice daily PPI. ENT recommended swallow study. Symptoms occur when she turns in bed also when she bends over. She has cough until she develops rhinorrhea which resolves episodes no productive sputum. Does not believe that inhalers were of benefit. Denies having GE reflux except when she has spaghetti. Elevated BMI. Cough did not improve with change from ACE and ARB to amlodipine.     L knee pain intermittently. Doing relatively well now. BMI is elevated.     Vertigo not an issue at this time. Chronic tinnitus. History ocular migraines.      Hypertension. amlodipine and Dyazide. Blood pressures been good. Feels like her weight is better secondary to decreased appetite with medication. Not exercising. A1c 5.3 HDL 96. No chest pain palpitations or exercise intolerance however relatively sedentary      . No abnormal Paps however had thickened endometrium and fibroids. Negative atypia on biopsy however still continue to have bleeding. Fernando Connollyr estrogen and Provera. [facility] in [facility]. They will be ordering her mammogram.  No family history of breast or ovarian cancer not sexually active.     adenomatous polyp 12.2019. mother colon cancer. Recheck 2022 + hemorrhoids      [place] in her sister. dysplastic nevus 12 2018 + sunscreen routine dermatology follow-up March 2021. No concerns today.     Decreasing BMI. Some weight loss. Knee pain. Empiric GE reflux. No prediabetes. Elevated lipids. Sleep apnea suspect         PMH:    Uterine cyst    No pregnancy    endometrial biopsy    colonosopyc     NKDA     SH: lives alone. No tobacco. Wine 2 glasses nightly wine . Using fit bit. 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 Passed away 4years ago and pt with grief. Work improved     FH: 2 siblings    + colon mom 74's ,  MI, melanoma      - mental illness SA      ROS:    Dry skin, chronic cough, glasses. Rare snoring. Breast cysts. Denies JHON. Hx blood in stool adenomatous polyp and tics. Recheck 2022 ., + knee pain, no edema, stones, vaginal bleeding endometrial bx. . + grief. No skin cancers. Occasional sunburn. Wears a seatbelt. Denies any depression or anxiety currently. Has a smoke alarm and fire extinguisher. Date with Pap and mammograms. No recurrent urinary tract infections or urine concerns. No STDs. Amenorrheic. Hot flashes. No chronic postnasal drip.     Constitutional, ent, CV, respiratory, GI, , joint, skin, allergic and psychiatric ROS reviewed and negative except for above    Allergies   Allergen Reactions    Sammy Flavor        Outpatient Medications Marked as Taking for the 2/18/21 encounter (Office Visit) with Corky Pizarro MD   Medication Sig Dispense Refill    estradiol (ESTRACE) 1 MG tablet TAKE 1 TABLET BY MOUTH EVERY DAY      progesterone (PROMETRIUM) 200 MG capsule TAKE 1 CAPSULE BY MOUTH EVERYDAY AT BEDTIME      amLODIPine (NORVASC) 10 MG tablet Take 1 tablet by mouth daily To replace losartan 90 tablet 1    montelukast (SINGULAIR) 10 MG tablet TAKE 1 TABLET BY MOUTH EVERY DAY 90 tablet 0    triamterene-hydroCHLOROthiazide (MAXZIDE-25) 37.5-25 MG per tablet TAKE 1 TABLET BY MOUTH EVERY DAY 90 tablet 1             Past Medical History:   Diagnosis Date    Asthma     Benign cyst of breast     Hypertension        Past Surgical History:   Procedure Laterality Date    COLONOSCOPY  12/2019    Dr. Camacho Chacon    COLONOSCOPY N/A 12/5/2019    COLONOSCOPY POLYPECTOMY SNARE/COLD BIOPSY performed by Angela Dee MD at [address]  2004             Family History   Problem Relation Age of Onset    Colon Cancer Mother     Other Father     Cancer Sister     Diabetes Brother         Melanoma           Objective     /87   Pulse 98   Temp 97 °F (36.1 °C)   Resp 16   Ht 5' 8\" (1.727 m)   Wt 224 lb (101.6 kg)   SpO2 98% Comment: RA  BMI 34.06 kg/m²     @LASTSAO2(3)@    Wt Readings from Last 3 Encounters:   11/02/20 231 lb (104.8 kg)   08/19/20 229 lb (103.9 kg)   12/05/19 223 lb (101.2 kg)       Physical Exam     NAD alert and cooperative  HEENT: TMs unremarkable. Throat is clear. No adenopathy or thyromegaly. Intermittent cough and sneezing when going to the supine position. Lungs clear good SATNIAGO ratio however decreased breath sounds. Cardiovascular Tomas regular rate and rhythm no murmur click. Pendulous breasts no adenopathy  Abdomen is obese without hepatosplenomegaly. Crepitus right knee. No peripheral edema. Multiple nevi seborrheic keratoses. Some skin tags. Appropriate affect. Well-groomed.     Chemistry        Component Value Date/Time     08/19/2020 1341    K 3.8 08/19/2020 1341     08/19/2020 1341    CO2 28 08/19/2020 1341    BUN 13 08/19/2020 1341    CREATININE 0.8 08/19/2020 1341        Component Value Date/Time    CALCIUM 9.5 08/19/2020 1341    ALKPHOS 70 09/18/2019 0945    AST 22 09/18/2019 0945    ALT 25 09/18/2019 0945    BILITOT 0.3 09/18/2019 0945            Lab Results   Component Value Date    WBC 5.1 03/22/2017    HGB 14.4 03/22/2017    HCT 43.7 03/22/2017 MCV 87.0 03/22/2017     03/22/2017     Lab Results   Component Value Date    LABA1C 5.3 03/22/2017     Lab Results   Component Value Date    .4 03/22/2017     Lab Results   Component Value Date    LABA1C 5.3 03/22/2017     No components found for: CHLPL  Lab Results   Component Value Date    TRIG 58 09/18/2019    TRIG 102 05/22/2018    TRIG 85 03/22/2017     Lab Results   Component Value Date    HDL 83 (H) 08/19/2020    HDL 87 (H) 09/18/2019    HDL 96 (H) 05/22/2018     Lab Results   Component Value Date    LDLCALC 164 (H) 08/19/2020    LDLCALC 140 (H) 09/18/2019    LDLCALC 111 (H) 05/22/2018     Lab Results   Component Value Date    LABVLDL 20 08/19/2020    LABVLDL 12 09/18/2019    LABVLDL 20 05/22/2018       Old labs and records reviewed or requested  Discussed past lab and studies with patient      Diagnosis Orders   1. Chronic cough  SLP video swallow   2. Pure hypercholesterolemia  Lipid Panel   3. History of adenomatous polyp of colon     4. Essential hypertension  Comprehensive Metabolic Panel    triamterene-hydroCHLOROthiazide (MAXZIDE-25) 37.5-25 MG per tablet   5. Obesity (BMI 30.0-34.9)     6. Need for influenza vaccination  INFLUENZA, QUADV, 3 YRS AND OLDER, IM PF, PREFILL SYR OR SDV, 0.5ML (AFLURIA QUADV, PF)   7. Right elbow pain  XR ELBOW RIGHT (2 VIEWS)       Chronic cough. Will get swallow study. Stop Prilosec to see if this makes a difference. Tessalon Perles. Hold on sleep study at this time. Anticipate discontinuing Singulair. Possible follow-up with pulmonary. Hyperlipidemia lipid profile. History of adenomatous polyp repeat colonoscopy 2022. Hypertension good control on current medications. Obesity. Continue on weight loss decreased calorie intake. GYN care elsewhere      No follow-ups on file. Diagnosis and treatment discussed.   Possible side effects of medication reviewed  Patients questions answered  Follow up understood  Pt aware if they are not

## 2021-02-18 NOTE — PATIENT INSTRUCTIONS
Stop omeprazole. Restart if cough is worse   Consider shingles vaccine  Nice weight loss   Tessalon for cough  Call for swallow test   Continue BP meds  30 min exercise daily.   Fax order on mammogram to [phone]

## 2021-02-19 ENCOUNTER — TELEPHONE (OUTPATIENT)
Dept: FAMILY MEDICINE CLINIC | Age: 56
End: 2021-02-19

## 2021-02-19 NOTE — TELEPHONE ENCOUNTER
PT called in stating that she has an order from [facility] in [facility] for a pelvic exam. PT wanted to know if she needed Dr. Ileana Nelson would need to put in an order or drop off the order from her obgyn for Dr. Ileana Nelson to mirror what that doctor needs done.        Please call PT back: [phone]

## 2021-02-19 NOTE — TELEPHONE ENCOUNTER
PT called back in regarding message left. PT says that she didn't see until today that her gyn wants her to get a pelvic exam because she is having periods when she shouldn't be having them. PT says that she bring the order to the office if necessary.

## 2021-02-19 NOTE — TELEPHONE ENCOUNTER
PT came into the office with orders from her gyn for a pelvic exam.     Made copies to place in Laurie's basket and gave originals back to PT.

## 2021-02-21 LAB — IGE: 44 KU/L

## 2021-02-22 DIAGNOSIS — N93.9 VAGINAL BLEEDING: Primary | ICD-10-CM

## 2021-02-26 ENCOUNTER — HOSPITAL ENCOUNTER (OUTPATIENT)
Dept: ULTRASOUND IMAGING | Age: 56
Discharge: HOME | End: 2021-02-26
Payer: COMMERCIAL

## 2021-02-26 DIAGNOSIS — N93.9 VAGINAL BLEEDING: ICD-10-CM

## 2021-02-26 PROCEDURE — 76856 US EXAM PELVIC COMPLETE: CPT

## 2021-03-22 NOTE — PATIENT INSTRUCTIONS
Sun Protection Tips    Apply broad spectrum water resistant sunscreen with an SPF of at least 30 to exposed areas of the skin. Dont forget the ears and lips! Remember to reapply sunscreen about every 2 hours and after swimming or sweating. Wear sun protective clothing. Swim shirts (aka. rash guards) are a great idea and negates the need to reapply sunscreen in those areas. Seek the shade whenever possible especially between the hours of 10am and 4pm when the suns rays are the strongest.     Avoid tanning beds          Wear a wide brim hat while in the sun     Biopsy Wound Care Instructions   Cleanse the wound twice a day with mild soapy water.  Gently dry the area.  Apply Vaseline/Aquaphor to the wound using a cotton tipped applicator or Qtip.  Cover with a clean bandage.  Repeat this process until the biopsy site is healed. You will know when it's healed when it's pink and shiny.  You may shower and bathe as usual.      If bleeding should occur, apply firm pressure to bleeding area for 15 minutes and repeat if needed. If bleeding continues after 30 minutes, please call office and ask to speak to Lianet.        ** Biopsy results generally take around 7-10  business days to come back. A member of Dr. Zoë Martini staff will call you when the results are have been reviewed and a personalized treatment plan has been established. If you don't hear from our staff after the 10 business days, please call our office for your results. Thanks for your visit! Feel free to send  Dr. Bishop Res assistant, Lianet, a [employer] message/call for any questions, concerns or  to schedule your cosmetic procedures.

## 2021-03-23 ENCOUNTER — OFFICE VISIT (OUTPATIENT)
Dept: DERMATOLOGY | Age: 56
End: 2021-03-23
Payer: COMMERCIAL

## 2021-03-23 VITALS — TEMPERATURE: 98.6 F

## 2021-03-23 DIAGNOSIS — L70.8 ACNEIFORM ERUPTION: Primary | ICD-10-CM

## 2021-03-23 DIAGNOSIS — D48.9 NEOPLASM OF UNCERTAIN BEHAVIOR: ICD-10-CM

## 2021-03-23 DIAGNOSIS — D22.9 MULTIPLE BENIGN NEVI: ICD-10-CM

## 2021-03-23 DIAGNOSIS — Z86.018 HISTORY OF DYSPLASTIC NEVUS: ICD-10-CM

## 2021-03-23 PROCEDURE — 11102 TANGNTL BX SKIN SINGLE LES: CPT | Performed by: DERMATOLOGY

## 2021-03-23 PROCEDURE — 11103 TANGNTL BX SKIN EA SEP/ADDL: CPT | Performed by: DERMATOLOGY

## 2021-03-23 PROCEDURE — 99214 OFFICE O/P EST MOD 30 MIN: CPT | Performed by: DERMATOLOGY

## 2021-03-23 RX ORDER — METRONIDAZOLE 7.5 MG/G
GEL TOPICAL
Qty: 60 G | Refills: 2 | Status: SHIPPED | OUTPATIENT
Start: 2021-03-23 | End: 2021-12-20

## 2021-03-23 RX ORDER — DOXYCYCLINE HYCLATE 100 MG
TABLET ORAL
Qty: 60 TABLET | Refills: 1 | Status: SHIPPED | OUTPATIENT
Start: 2021-03-23 | End: 2021-12-20

## 2021-03-23 NOTE — PROGRESS NOTES
[facility]) Dermatology  [place], [place], [place]       Izabela Kothari  1965    60 y.o. female     Date of Visit: 3/23/2021    Chief Complaint:   Chief Complaint   Patient presents with    Skin Lesion     spot/TBSE        I was asked to see this patient by Dr. Christianson ref. provider found. History of Present Illness:  Izabela Kothari is a 60 y.o. female who presents with the chief complaint of the followin. Total-body skin exam for spots. Many year history of multiple nevi on the head/neck, trunk and extremities, all present for many years. Denies new moles. Denies moles changing in size, shape, color. None associated w/ pain, bleeding, pruritus. 2.  History of moderately dysplastic nevus located to right paraspinal mid back, not excised in 2020. Patient was noncompliant with recommended 30-minute surgical excision to definitively treat dysplastic nevus. Patient states she is unable to observe the area for changes, she lives alone. Unsure if it has recurred. Denies any pain or bleeding to the area. 3.  New complaint of acne-like outbreak to her nose and chin and perioral cheeks. Started about 2 months ago. Does admit to starting CBD oral supplementation around that time but then stopped about 3 weeks ago without improvement to rash. Denies any painful deep cystic lesions. States she was on hormonal replacement therapy for some time but has since discontinued. States she follows with gynecology regularly to monitor thickening of her uterine lining but states the gynecologist does not feel it is cancerous. Washes face twice daily with a gentle cleanser. Moisturizes face regularly with a non-comedogenic moisturizer. Does not wear her mask often as she works from home and lives alone. Admits to sun exposure in youth without wearing sunscreen, hats, or protective clothing. Only wears SPF 30 at the beginning of the summer and then decreases the SPF to 15 or below.   She History     Socioeconomic History    Marital status:      Spouse name: Not on file    Number of children: Not on file    Years of education: Not on file    Highest education level: Not on file   Occupational History    Not on file   Social Needs    Financial resource strain: Not on file    Food insecurity     Worry: Not on file     Inability: Not on file    Transportation needs     Medical: Not on file     Non-medical: Not on file   Tobacco Use    Smoking status: Never Smoker    Smokeless tobacco: Never Used   Substance and Sexual Activity    Alcohol use: Yes     Comment: 1 wine daily    Drug use: No    Sexual activity: Not Currently     Partners: Male   Lifestyle    Physical activity     Days per week: Not on file     Minutes per session: Not on file    Stress: Not on file   Relationships    Social connections     Talks on phone: Not on file     Gets together: Not on file     Attends [employer] service: Not on file     Active member of club or organization: Not on file     Attends meetings of clubs or organizations: Not on file     Relationship status: Not on file    Intimate partner violence     Fear of current or ex partner: Not on file     Emotionally abused: Not on file     Physically abused: Not on file     Forced sexual activity: Not on file   Other Topics Concern    Not on file   Social History Narrative    Not on file       Physical Examination     The following were examined and determined to be normal: Psych/Neuro, Scalp/hair, Conjunctivae/eyelids, Gums/teeth/lips, Neck, Breast/axilla/chest, Abdomen, RUE, LUE, RLE and Nails/digits. buttocks. Areas covered by underwear garment(s) not examined. The following were examined and determined to be abnormal: Head/face, Back and LLE.      Brunner phototype: 2    -Constitutional: Well appearing, no acute distress  -Neurological: Alert and oriented X 3  -Mood and Affect: Pleasant  Total body skin exam performed, areas examined listed above: 1. 5-10 inflammatory papules to chin and perioral cheeks, one inflammatory papule to nose. No comedones. No nodulocystic lesions. Background mild erythema to chin, cheeks, nose   2a. L paraspinal superior back- 0.6x0.5cm irregular bordered asymmetrical brown papule with mild erythema and nonuniform features on dermoscopy  2b. R medial mid-back-0.6x0.5cm irregular bordered asymmetrical brown papule with few areas of hypopigmentation on dermoscopy      2c. L lateral dorsal foot- 0.5x0.4cm irregularly bordered asymmetrical brown macule with nonuniform features on dermoscopy      3. Scattered on the head,neck, trunk and extremities are multiple well-defined round and oval symmetric smoothly-bordered uniformly brown macules and papules; no bleeding nevi. 4. R paraspinal mid back- well healed scar, clear   Assessment and Plan     1. Acneiform eruption    2. Neoplasm of uncertain behavior    3. Multiple benign nevi    4. History of dysplastic nevus        1. Acneiform eruption  DDx: Perioral dermatitis v. Rosacea v. Adult acne? I discussed the importance using mild gentle cleansers like OTC Cetaphil, washing face morning and night, moisturizers like OTC CeraVE with SPF 30 in AM and CeraVe PM moisturizer nightly, and cosmetics that are non-comedogenic. Patient advised to contact the office if acne worsens or fails to improve despite months of treatment, new scars, or significantly worsening cysts or nodules. May take 3-6 months to see significant improvement in acne  -start metrogel 0.75% BID to affected areas on face  -start doxycycline 100mg po bid for 2 months  -Edu re: GI upset, pill esophagitis, photosensitivity, yeast infection, rare pseudotumor cerebri risk (HA, visual changes), (no pregnancy)  -Avoid CBD supplements, may have influenced eruption?   -Wear a clean laundered mask with every use. 2. Neoplasm of uncertain behavior  DDx:  A. Rule out dysplastic nevus  B.   Rule out dysplastic nevus  C.

## 2021-03-26 LAB — DERMATOLOGY PATHOLOGY REPORT: ABNORMAL

## 2021-03-29 PROBLEM — D23.9 DYSPLASTIC NEVI: Status: ACTIVE | Noted: 2021-03-29

## 2021-03-29 PROBLEM — D03.9 MELANOMA IN SITU (HCC): Status: ACTIVE | Noted: 2021-03-29

## 2021-03-30 ENCOUNTER — TELEPHONE (OUTPATIENT)
Dept: DERMATOLOGY | Age: 56
End: 2021-03-30

## 2021-03-30 DIAGNOSIS — D03.72 MELANOMA IN SITU OF LEFT LOWER EXTREMITY INCLUDING HIP (HCC): Primary | ICD-10-CM

## 2021-03-30 NOTE — TELEPHONE ENCOUNTER
I called patient and discussed the following biopsy results with her:  A. Mildly dysplastic nevus to left paraspinal superior back, completely excised with biopsy  B. Moderately dysplastic nevus located to right medial mid back completely excised with biopsy  C.  Melanoma in situ, completely excised. No further treatment needed at this time to sites A and B. Patient advised to monitor the area for recurrence and call office if recurs.    -Although melanoma in situ is noted as excised on biopsy, wider margins of 5mm to 1cm recommended to decrease odds of recurrence.   Referral sent to Dr. Franck Garcia for consultation with patient regarding either slow Mohs or surgical excision depending on her professional judgement.   -Lianet, please schedule patient for 3-month skin exam.  Plan for 3-month skin exams for first year, then 6-month skin exams for 5 years thereafter.  -Note sent to PCP

## 2021-04-09 ENCOUNTER — TELEPHONE (OUTPATIENT)
Dept: FAMILY MEDICINE CLINIC | Age: 56
End: 2021-04-09

## 2021-04-09 NOTE — TELEPHONE ENCOUNTER
Pt cut index finger at home. Pt is bleeding but it isnt gushing. Pt wanting to know if she needs stitches. All docs are full for the day. Recommended urgent care.

## 2021-04-11 DIAGNOSIS — R05.3 CHRONIC COUGH: ICD-10-CM

## 2021-04-12 RX ORDER — BENZONATATE 100 MG/1
CAPSULE ORAL
Qty: 90 CAPSULE | Refills: 0 | Status: SHIPPED | OUTPATIENT
Start: 2021-04-12 | End: 2022-09-26 | Stop reason: ALTCHOICE

## 2021-05-15 DIAGNOSIS — L70.8 ACNEIFORM ERUPTION: ICD-10-CM

## 2021-05-18 RX ORDER — DOXYCYCLINE HYCLATE 100 MG
TABLET ORAL
Qty: 60 TABLET | Refills: 1 | OUTPATIENT
Start: 2021-05-18

## 2021-05-26 ENCOUNTER — PROCEDURE VISIT (OUTPATIENT)
Dept: SURGERY | Age: 56
End: 2021-05-26
Payer: COMMERCIAL

## 2021-05-26 VITALS — DIASTOLIC BLOOD PRESSURE: 84 MMHG | SYSTOLIC BLOOD PRESSURE: 135 MMHG | TEMPERATURE: 97.7 F

## 2021-05-26 DIAGNOSIS — D03.72 MELANOMA IN SITU OF LEFT LOWER EXTREMITY (HCC): Primary | ICD-10-CM

## 2021-05-26 PROCEDURE — 11622 EXC S/N/H/F/G MAL+MRG 1.1-2: CPT | Performed by: DERMATOLOGY

## 2021-05-26 NOTE — PROGRESS NOTES
EXCISION OPERATIVE PROCEDURE NOTE    SURGEON: Cortney Morocho. Mary Mendez MD    ASSISTANT:  Jesu Smith RN    REFERRING PROVIDER:  Jocelyn Ayala D.O.    PREOPERATIVE DIAGNOSIS: Melanoma in situ    POSTOPERATIVE DIAGNOSIS: SAME. OPERATIVE PROCEDURE: EXCISION    RECONSTRUCTION OF DEFECT: Second Intention Wound Healing    LOCATION: Left lateral dorsal foot     SIZE OF LESION: 8x5 MM     SIZE OF LESION PLUS CIRCUMFERENTIAL MARGIN: 18x15 MM     FINAL REPAIR LENGTH: N/A    ANESTHESIA: 5 CC XYLOCAINE 1% WITH EPINEPHRINE 1:100,000, BUFFERED. DURATION OF PROCEDURE: 20 MINUTES     POSTOPERATIVE OBSERVATION: 30 MINUTES     EBL: MINIMAL. SPECIMENS: One    COMPLICATIONS: NONE     DESCRIPTION OF PROCEDURE: The patient was given a mirror and the biopsy site was identified, marked with surgical marking pen, and verified with the patient. Written consent was obtained. There was a time out for person and procedure verification. The operative site was cleansed with Chlorhexidine gluconate 2% with isopropylalcohol 70%, then cleaned off, dried, and draped sterilely. The lesion was excised in a disc design down to subcutaneous fat with 5 mm margins. Hemostasis was achieved with electrosurgery. DEFECT MANAGEMENT:  After discussion with the patient regarding the various options, it was decided to allow the defect to heal by second intention (granulation). Healing time, wound care and scarring were discussed with the patient and he/she feels capable of taking care of the wound. The wound was cleaned with normal saline solution, dried off, Aquaphor ointment was applied, and the wound was covered. A dressing was applied for stabilization and light pressure. The patient was given detailed oral and written instructions on postoperative care. There were no complications. The patient left the Unit in good medical condition and was scheduled to return for suture removal/wound check in 14-21 days.

## 2021-05-26 NOTE — PATIENT INSTRUCTIONS
[facility] Office Hours:    Monday-Thursday  7:30 AM-4:30 PM    Friday  9:00 AM-1:00 PM    DAILY WOUND CARE-SECOND INTENTION    1.  Keep the area absolutely dry for 48 hours. -After 48 hours you may remove the bandage and shower. 2.  Remove the bandage and clean the wound with mild soap and water. Try to clean off crust and debris. -After one week, you may rub the surface of the wound fairly aggressively (a small amount of bleeding is normal when you do this). It is important not to allow a scab to form as scabs slow down the healing process. Dry the area with a clean Q-tip or gauze. 3.  Apply a layer of Vaseline (or Bacitracin if your doctor recommends) to the wound area  only. 4.  Cut a piece of Telfa (or any non-stick dressing) to fit just over the wound and secure it with paper tape. If the wound is small you may use a Band-Aid    You should continue daily wound care and keep a bandage on until new skin has grown over the entire wound    ? Bleeding: If bleeding occurs, DO NOT remove the bandage. Put firm pressure on the area with gauze for 20 minutes without peeking. If the bleeding continues, apply pressure for 20 minutes more. ? If the bleeding does not stop after you apply pressure, call us right away. If you cant call, go to the nearest emergency room or urgent care facility. POST-OPERATIVE INSTRUCTIONS     1. Activity: Do not lift anything heavier than a gallon of milk for 1 week. Also, avoid strenuous activity such as running, power walking or contact sports. 2.  Eating and drinking: Do not drink alcohol for 48 hours after your procedure. Alcohol increases the chances of bleeding. 3.  Medicines:  -If you have discomfort, take acetaminophen (Tylenol or Extra Strength Tylenol). Follow the instructions and warning on the bottle. -If your doctor has prescribed you an aspirin daily, please keep taking it.  Do not take extra aspirin or medicines containing aspirin (such as [place] and Excedrin) for 48 hours after your procedure. 4.  Symptoms: You may have these symptoms. They are normal and should get better with time:  A. Swelling. Swelling usually increases for 24 to 48 hours after your procedure and then begins to improve. B.  Some soreness and redness around your wound. C.  Bruising which can last for up to 2 weeks    WHAT TO EXPECT FOR THE COMING WEEKS TO MONTHS  1. You may use make-up once the area is well healed. 2.  Vitamin E oil is NOT necessary. A good moisturizer is just as effective. 3.  Sunscreen IS necessary. Use at least an SPF 30 sunscreen daily- even in the winter.    -Scars take from 6 months to 1 year to fully mature. After the area has healed, it may be helpful to gently massage the area with a moisturizer, petroleum jelly (Vaseline) or Aquaphor. This helps to soften the scar tissue.   -The color of the scar will even out over time, but may remain pink for several months. Swelling may also remain for several months, especially if the area is on the legs. Call us at [phone] right away if you have any of the following symptoms:   Bleeding that you cant stop (see above)   Pain that lasts longer than 48 hours   Your wound becomes more painful, red or hot   Bruising and swelling that does not improve within 48 hours or gets worse suddenly      Vinegar soaks - 1 part vinegar to 4 parts water - apply with cotton ball - hold on area for approximately 5 minutes - 1 time daily.

## 2021-05-27 ENCOUNTER — TELEPHONE (OUTPATIENT)
Dept: SURGERY | Age: 56
End: 2021-05-27

## 2021-05-27 NOTE — TELEPHONE ENCOUNTER
The patient was in the office on 5/26/21 for excision located on the left lateral dorsal foot with second intention wound healing repair. The patient tolerated the procedure well and left the office in good condition. Pain level on post-operative day 1:  none    Any bleeding episode that required pressure to be held, bandage change or a call to the office or MD?  no     Any other issues?:  no    A post-operative telephone call was placed at [phone] in order to check on the patient's recovery process. The patient reported doing well and had no complaints other than those listed above, if any. All of the patient's questions were answered.

## 2021-06-02 ENCOUNTER — TELEPHONE (OUTPATIENT)
Dept: SURGERY | Age: 56
End: 2021-06-02

## 2021-06-02 NOTE — TELEPHONE ENCOUNTER
I  called & spoke with the patient today by telephone. I reviewed results of the excision with the patient. Date of excision: 5/26/2021    Site of excision: Left lateral dorsal foot    Result: No residual melanoma in situ, clear margins    Plan: No further treatment needed    The patient expressed understanding of the plan. I advised to call w/any questions/concerns.

## 2021-06-15 ENCOUNTER — OFFICE VISIT (OUTPATIENT)
Dept: SURGERY | Age: 56
End: 2021-06-15
Payer: COMMERCIAL

## 2021-06-15 VITALS — TEMPERATURE: 97.6 F

## 2021-06-15 DIAGNOSIS — Z51.89 VISIT FOR WOUND CHECK: Primary | ICD-10-CM

## 2021-06-15 PROCEDURE — 99213 OFFICE O/P EST LOW 20 MIN: CPT | Performed by: DERMATOLOGY

## 2021-07-09 ENCOUNTER — OFFICE VISIT (OUTPATIENT)
Dept: DERMATOLOGY | Age: 56
End: 2021-07-09

## 2021-07-09 VITALS — TEMPERATURE: 97.4 F

## 2021-07-09 DIAGNOSIS — Z86.018 HISTORY OF DYSPLASTIC NEVUS: ICD-10-CM

## 2021-07-09 DIAGNOSIS — Z86.006 HISTORY OF MELANOMA IN SITU: ICD-10-CM

## 2021-07-09 DIAGNOSIS — D48.9 NEOPLASM OF UNCERTAIN BEHAVIOR: Primary | ICD-10-CM

## 2021-07-09 DIAGNOSIS — L81.4 SOLAR LENTIGINOSIS: ICD-10-CM

## 2021-07-09 DIAGNOSIS — D22.9 MULTIPLE BENIGN NEVI: ICD-10-CM

## 2021-07-09 PROCEDURE — 11102 TANGNTL BX SKIN SINGLE LES: CPT | Performed by: DERMATOLOGY

## 2021-07-09 PROCEDURE — 99213 OFFICE O/P EST LOW 20 MIN: CPT | Performed by: DERMATOLOGY

## 2021-07-09 NOTE — PROGRESS NOTES
[facility]) Dermatology  [place], [place], [address]       Phillis Barthel  1965    60 y.o. female     Date of Visit: 2021    Chief Complaint:   Chief Complaint   Patient presents with    Skin Exam     3 mo FSE     HX: Malignant Melanoma        I was asked to see this patient by Dr. Christianson ref. provider found. History of Present Illness:  Phillis Barthel is a 60 y.o. female who presents with the chief complaint of the followin. Total-body skin exam for spots. Many year history of multiple nevi on the head/neck, trunk and extremities, all present for many years. Denies new moles. Denies moles changing in size, shape, color. None associated w/ pain, bleeding, pruritus. 2.  History of multiple dysplastic nevi, patient denies recurrence. 3.  History of melanoma in situ to left lateral dorsal foot status post surgical excision by Dr. Brianna Hernandez May 2021. Patient denies recurrence. Denies history of recent unexplained weight loss, fever/chills, shortness of breath, cough, frequent irretractable headaches, swollen lymph nodes. Denies any new or changing pigmented lesions. 4. Progressive freckling and lentigines located to sun exposed areas on head/neck, torso, extremities over several years,Denies changes in size, shape, or color. Admits to sun exposure in youth without wearing sunscreen, hats, or protective clothing. Only wears SPF 30 at the beginning of the summer and then decreases the SPF to 15 or below. She admits to liking to tan outdoors and wants to continue. History of indoor tanning bed use, has stopped using over the last 3+ years. Review of Systems:  Constitutional: Reports general sense of well-being   Skin: No new or changing moles, no tendency to develop thick scars, no interval of severe sunburns  Heme: No abnormal bruising or bleeding.       Past Skin Hx:  -3/2021-MMIS to left lateral dorsal foot completely excised with biopsy s/p surgical excision Dr. Anamika Denton Jamie in May 2021  -3/2021-moderately dysplastic nevus completely excised located to left paraspinal superior back  -3/2021-moderately dysplastic nevus completely excised located to right medial mid back  -12/2020-moderately dysplastic nevus to right paraspinal mid back status post shave biopsy, not excised. Patient noncompliant with request for follow-up with surgical excision. No evidence of recurrence noted in 3/2021   Patient denies past history of NMSC    PFHx: sister-history of melanoma    ADDITIONAL HISTORY:    I have reviewed past medical and surgical histories, current medications, allergies, social and family histories as documented in the patient's electronic medical record. Family History   Problem Relation Age of Onset    Colon Cancer Mother     Other Father     Cancer Sister     Diabetes Brother         Melanoma     Past Medical History:   Diagnosis Date    Asthma     Benign cyst of breast     Hypertension      Past Surgical History:   Procedure Laterality Date    COLONOSCOPY  12/2019    Dr. Laura Manzano    COLONOSCOPY N/A 12/5/2019    COLONOSCOPY POLYPECTOMY SNARE/COLD BIOPSY performed by Devi Miranda MD at [address] [place] [address]  2004       Allergies   Allergen Reactions    Sammy Flavor      Outpatient Medications Marked as Taking for the 7/9/21 encounter (Office Visit) with Nikky Christianson, DO   Medication Sig Dispense Refill    montelukast (SINGULAIR) 10 MG tablet TAKE 1 TABLET BY MOUTH EVERY DAY 30 tablet 5    benzonatate (TESSALON) 100 MG capsule TAKE 1 CAPSULE BY MOUTH THREE TIMES A DAY AS NEEDED FOR COUGH 90 capsule 0    doxycycline hyclate (VIBRA-TABS) 100 MG tablet Take one tablet PO BID with food and full glass of water. Remain upright for 1 hour afterwards.  60 tablet 1    metroNIDAZOLE (METROGEL) 0.75 % gel Apply topically to affected areas on face BID 60 g 2    estradiol (ESTRACE) 1 MG tablet TAKE 1 TABLET BY MOUTH EVERY DAY      progesterone (PROMETRIUM) 200 MG capsule TAKE 1 CAPSULE BY MOUTH EVERYDAY AT BEDTIME      amLODIPine (NORVASC) 10 MG tablet Take 1 tablet by mouth daily To replace losartan 90 tablet 1    triamterene-hydroCHLOROthiazide (MAXZIDE-25) 37.5-25 MG per tablet TAKE 1 TABLET BY MOUTH EVERY DAY 90 tablet 1    DULERA 200-5 MCG/ACT inhaler TAKE 2 PUFFS BY MOUTH TWICE A DAY 3 Inhaler 2    albuterol sulfate  (90 Base) MCG/ACT inhaler Inhale 2 puffs into the lungs 4 times daily as needed for Wheezing 3 Inhaler 0    albuterol (ACCUNEB) 1.25 MG/3ML nebulizer solution Inhale 3 mLs into the lungs every 6 hours as needed for Wheezing 360 mL 3       Social History:   Social History     Socioeconomic History    Marital status:      Spouse name: Not on file    Number of children: Not on file    Years of education: Not on file    Highest education level: Not on file   Occupational History    Not on file   Tobacco Use    Smoking status: Never Smoker    Smokeless tobacco: Never Used   Vaping Use    Vaping Use: Never used   Substance and Sexual Activity    Alcohol use: Yes     Comment: 1 wine daily    Drug use: No    Sexual activity: Not Currently     Partners: Male   Other Topics Concern    Not on file   Social History Narrative    Not on file     Social Determinants of Health     Financial Resource Strain:     Difficulty of Paying Living Expenses:    Food Insecurity:     Worried About Running Out of Food in the Last Year:     Ran Out of Food in the Last Year:    Transportation Needs:     Lack of Transportation (Medical):      Lack of Transportation (Non-Medical):    Physical Activity:     Days of Exercise per Week:     Minutes of Exercise per Session:    Stress:     Feeling of Stress :    Social Connections:     Frequency of Communication with Friends and Family:     Frequency of Social Gatherings with Friends and Family:     Attends Anabaptism Services:     Active Member of Clubs or Organizations:     Attends Club or Organization Meetings:     Marital Status:    Intimate Partner Violence:     Fear of Current or Ex-Partner:     Emotionally Abused:     Physically Abused:     Sexually Abused:        Physical Examination     The following were examined and determined to be normal: Psych/Neuro, head/face, Scalp/hair, Conjunctivae/eyelids, Gums/teeth/lips, Neck, Breast/axilla/chest, Abdomen, RUE, LUE, RLE, LLE and Nails/digits. buttocks. Areas covered by underwear garment(s) not examined. The following were examined and determined to be abnormal:Back. Brunner phototype: 2    -Constitutional: Well appearing, no acute distress  -Neurological: Alert and oriented X 3  -Mood and Affect: Pleasant  Total body skin exam performed, areas examined listed above:   1. Right inferior back-0.4 x 0.2 cm irregular bordered brown papule with ill-defined features on dermoscopy    2. Scattered on the head,neck, trunk and extremities are multiple well-defined round and oval symmetric smoothly-bordered uniformly brown macules and papules; no bleeding nevi. 3. several discrete and coalescing few 2-4 mm round uniformly brown macules scattered to face, neck, and sun exposed areas on torso and extremities  4. Right medial mid back, left paraspinal superior back-well-healed scars, clear  5. R paraspinal mid back- well healed scar, clear   Assessment and Plan     1. Neoplasm of uncertain behavior    2. Multiple benign nevi    3. Solar lentiginosis    4. History of dysplastic nevus    5. History of melanoma in situ      1. Neoplasm uncertain behavior  DDx: Rule out dysplastic nevus  -Discussed possible diagnosis. Patient agreeable to biopsy. Verbal consent obtained after risks (infection, bleeding, scar, dyspigmentation), benefits and alternatives explained. -Area(s) to be biopsied were marked with a surgical pen. Site(s) were cleansed with alcohol.  Local anesthesia achieved with 1% lidocaine with epinephrine/sodium bicarbonate. Shave biopsy performed with a razor blade. Hemostasis was achieved with aluminum chloride. The wound(s) were dressed with petrolatum and covered with a bandage. Specimen(s) sent to pathology. Pt educated re: risk of bleeding, infection, scar, discoloration, and wound care instructions. 2.Multiple benign nevi  Benign acquired melanocytic nevi  -Recommend monthly self skin exams   -Educated regarding the ABCDEs of melanoma detection   -Call for any new/changing moles or concerning lesions  -Reviewed sun protective behavior -- sun avoidance during the peak hours of the day, sun-protective clothing (including hat and sunglasses), sunscreen use (water resistant, broad spectrum, SPF at least 30, need for reapplication every 1.5 to 2 hours), avoidance of tanning beds   -Plan: Observation with annual skin checks (earlier if indicated) performed in office to monitor current nevi and to assess for new lesions. 3. Solar lentiginosis  Solar lentigines  -Edu re: benignity, relationship w/ chronic cumulative sun exposure, darkening w/ unprotected sun exposure  -Reviewed sun protective behavior -- sun avoidance during the peak hours of the day, sun-protective clothing (including hat and sunglasses), sunscreen use (water resistant, broad spectrum, SPF at least 30, need for reapplication every 2 to 3 hours), avoidance of tanning beds   Observation, pt to call if changes in size, shape, color or experiences bleeding/pain/itching        4. History of dysplastic nevus  No evidence of recurrence  -Reviewed clinical significance of dysplastic nevi, which are markers for an increased risk for the development of melanoma. Although melanoma may sometimes develop within a dysplastic nevus, it more commonly develops de kristian. Dysplastic nevi with moderate or severe cytologic atypia should be excised with clear margins. Recommend at least annual skin exams, earlier if notices suspicious new growths or changes.      5. History of melanoma in situ  -Well healed scar at site of prior melanoma, no evidence of recurrence  -pt to call if notices any changes in size, shape, color or experiences bleeding/pain/itching of moles  -Recommend follow-up annually with an ophthalmologist.  -understands risk of recurrence, possibility of developing a new melanoma,and the need to monitor skin for changes  -encouraged monthly TBSE self-skin exams and to call office if notices concerning lesions/changes  -discussed sun protection/sun avoidance    RTC Sept 2021 for TBSE      Return to Clinic: early Sept for TBSE  Discussed plan with patient and/or primary caretaker. Patient to call clinic with any questions / concerns. Reviewed proper use and side effects of treatment(s) and/or medication(s) with patient and/or primary caretaker. AVS provided or is available on [employer]     Note is transcribed using voice recognition software. Inadvertent computerized transcription errors may be present.

## 2021-07-09 NOTE — PATIENT INSTRUCTIONS
Sun Protection Tips    Apply broad spectrum water resistant sunscreen with an SPF of at least 30 to exposed areas of the skin. Dont forget the ears and lips! Remember to reapply sunscreen about every 2 hours and after swimming or sweating. Wear sun protective clothing. Swim shirts (aka. rash guards) are a great idea and negates the need to reapply sunscreen in those areas. Seek the shade whenever possible especially between the hours of 10am and 4pm when the suns rays are the strongest.     Avoid tanning beds          Wear a wide brim hat while in the sun    Biopsy Wound Care Instructions   Cleanse the wound twice a day with mild soapy water.  Gently dry the area.  Apply Vaseline/Aquaphor to the wound using a cotton tipped applicator or Qtip.  Cover with a clean bandage.  Repeat this process until the biopsy site is healed. You will know when it's healed when it's pink and shiny.  You may shower and bathe as usual.      If bleeding should occur, apply firm pressure to bleeding area for 15 minutes and repeat if needed. If bleeding continues after 30 minutes, please call office and ask to speak to Lianet.        ** Biopsy results generally take around 7-10  business days to come back. A member of Dr. Jojo Dominguez staff will call you when the results are have been reviewed and a personalized treatment plan has been established. If you don't hear from our staff after the 10 business days, please call our office for your results. Thanks for your visit! Feel free to call/Minust message  Dr. Mendy Al assistant, Lianet if you have questions, concerns or to schedule your cosmetic procedures.

## 2021-07-13 LAB — DERMATOLOGY PATHOLOGY REPORT: NORMAL

## 2021-07-19 ENCOUNTER — OFFICE VISIT (OUTPATIENT)
Dept: SURGERY | Age: 56
End: 2021-07-19

## 2021-07-19 DIAGNOSIS — Z51.89 VISIT FOR WOUND CHECK: Primary | ICD-10-CM

## 2021-07-19 PROCEDURE — 99212 OFFICE O/P EST SF 10 MIN: CPT | Performed by: DERMATOLOGY

## 2021-07-19 NOTE — PROGRESS NOTES
S: The patient is here for wound check s/p Mohs surgery on the left ankle with second intent wound healing, 7-8 week(s) ago. The patient c/o continued mild tenderness to palpation. O:  The wound has healed over weel. No erythema/purulence/pain. A/P:  Chronic stable problem:  scar s/p Mohs surgery. Status: The wound has healed well by second intention. No s/sx infection/bleeding.     OTC Meds:  no  Prescription Meds:  no  Co-morbid conditions affecting healing (I.e. tobacco, diabetes, pvd, peripheral edema, immunosuppression):  no

## 2021-08-11 DIAGNOSIS — I10 ESSENTIAL HYPERTENSION: ICD-10-CM

## 2021-08-11 RX ORDER — AMLODIPINE BESYLATE 10 MG/1
10 TABLET ORAL DAILY
Qty: 90 TABLET | Refills: 1 | Status: SHIPPED | OUTPATIENT
Start: 2021-08-11 | End: 2022-02-18

## 2021-09-08 DIAGNOSIS — I10 ESSENTIAL HYPERTENSION: ICD-10-CM

## 2021-09-08 RX ORDER — TRIAMTERENE AND HYDROCHLOROTHIAZIDE 37.5; 25 MG/1; MG/1
TABLET ORAL
Qty: 90 TABLET | Refills: 1 | Status: SHIPPED | OUTPATIENT
Start: 2021-09-08 | End: 2022-03-17

## 2021-11-10 DIAGNOSIS — R05.3 CHRONIC COUGH: ICD-10-CM

## 2021-11-10 RX ORDER — MONTELUKAST SODIUM 10 MG/1
TABLET ORAL
Qty: 60 TABLET | Refills: 2 | Status: SHIPPED | OUTPATIENT
Start: 2021-11-10 | End: 2022-05-16

## 2021-12-20 ENCOUNTER — OFFICE VISIT (OUTPATIENT)
Dept: FAMILY MEDICINE CLINIC | Age: 56
End: 2021-12-20
Payer: COMMERCIAL

## 2021-12-20 VITALS
OXYGEN SATURATION: 98 % | WEIGHT: 227 LBS | BODY MASS INDEX: 34.4 KG/M2 | SYSTOLIC BLOOD PRESSURE: 117 MMHG | DIASTOLIC BLOOD PRESSURE: 72 MMHG | HEART RATE: 91 BPM | RESPIRATION RATE: 16 BRPM | HEIGHT: 68 IN

## 2021-12-20 DIAGNOSIS — I10 PRIMARY HYPERTENSION: ICD-10-CM

## 2021-12-20 DIAGNOSIS — E66.9 OBESITY (BMI 30.0-34.9): ICD-10-CM

## 2021-12-20 DIAGNOSIS — E78.00 PURE HYPERCHOLESTEROLEMIA: ICD-10-CM

## 2021-12-20 DIAGNOSIS — Z23 NEED FOR INFLUENZA VACCINATION: ICD-10-CM

## 2021-12-20 DIAGNOSIS — Z85.820 HISTORY OF MELANOMA: ICD-10-CM

## 2021-12-20 DIAGNOSIS — R05.3 CHRONIC COUGH: Primary | ICD-10-CM

## 2021-12-20 DIAGNOSIS — Z86.010 HISTORY OF ADENOMATOUS POLYP OF COLON: ICD-10-CM

## 2021-12-20 DIAGNOSIS — Z80.0 FH: COLON CANCER: ICD-10-CM

## 2021-12-20 DIAGNOSIS — R73.9 ELEVATED BLOOD SUGAR: ICD-10-CM

## 2021-12-20 LAB
ANION GAP SERPL CALCULATED.3IONS-SCNC: 15 MMOL/L (ref 3–16)
BUN BLDV-MCNC: 15 MG/DL (ref 7–20)
CALCIUM SERPL-MCNC: 9.7 MG/DL (ref 8.3–10.6)
CHLORIDE BLD-SCNC: 98 MMOL/L (ref 99–110)
CHOLESTEROL, TOTAL: 251 MG/DL (ref 0–199)
CO2: 29 MMOL/L (ref 21–32)
CREAT SERPL-MCNC: 0.8 MG/DL (ref 0.6–1.1)
GFR AFRICAN AMERICAN: >60
GFR NON-AFRICAN AMERICAN: >60
GLUCOSE BLD-MCNC: 96 MG/DL (ref 70–99)
HDLC SERPL-MCNC: 95 MG/DL (ref 40–60)
LDL CHOLESTEROL CALCULATED: 141 MG/DL
POTASSIUM SERPL-SCNC: 3.7 MMOL/L (ref 3.5–5.1)
SODIUM BLD-SCNC: 142 MMOL/L (ref 136–145)
T4 FREE: 1.1 NG/DL (ref 0.9–1.8)
TRIGL SERPL-MCNC: 74 MG/DL (ref 0–150)
TSH REFLEX FT4: 5.85 UIU/ML (ref 0.27–4.2)
VLDLC SERPL CALC-MCNC: 15 MG/DL

## 2021-12-20 PROCEDURE — 90471 IMMUNIZATION ADMIN: CPT | Performed by: INTERNAL MEDICINE

## 2021-12-20 PROCEDURE — 99214 OFFICE O/P EST MOD 30 MIN: CPT | Performed by: INTERNAL MEDICINE

## 2021-12-20 PROCEDURE — 90674 CCIIV4 VAC NO PRSV 0.5 ML IM: CPT | Performed by: INTERNAL MEDICINE

## 2021-12-20 RX ORDER — GABAPENTIN 300 MG/1
300 CAPSULE ORAL NIGHTLY
Qty: 30 CAPSULE | Refills: 0 | Status: SHIPPED | OUTPATIENT
Start: 2021-12-20 | End: 2022-09-26 | Stop reason: ALTCHOICE

## 2021-12-20 RX ORDER — FLUTICASONE PROPIONATE 50 MCG
1 SPRAY, SUSPENSION (ML) NASAL DAILY
COMMUNITY

## 2021-12-20 RX ORDER — LORATADINE 10 MG/1
10 CAPSULE, LIQUID FILLED ORAL DAILY
COMMUNITY

## 2021-12-20 NOTE — PROGRESS NOTES
HPI: Roxana Cotton presents for chronic cough      Chronic health concerns include hypertension, asthma, familial colon cancer, hyperlipidemia, melanoma,      Going down the stairs with packages 3 days ago. Foot got caught in the rug and she fell forward. No loss of consciousness. Doing better. Chronic cough beginning in . Work-up through the years with normal x-ray, CT chest, laryngoscopy, pulmonary function test, allergy testing, and unresponsive to PPI and bronchodilators. ENT. .  Suggested speech therapy and barium swallow for cough. Sleep apnea suspect. Allergic rhinitis treated with Flonase and Singulair. No improvement with switch from ACE to ARB. One time was told it was related to stress. Tessalon of mild improvement. Never been on gabapentin. No family history of cough.      Vertigo not an issue at this time.  Chronic tinnitus.  History ocular migraines. Hypertension treated with amlodipine and Dyazide. Cough with ARB and ACE inhibitor no prediabetes. Elevated LDL    . No abnormal Paps however in endometrial thickening and fibroids. Negative biopsy for cancer. Transient estrogen and Provera treatment. No more bleeding. GYN is in [facility]. Pap and mammogram 2021. No family history of breast or ovarian cancer. Not sexually active       adenomatous polyp . mother colon cancer. Recheck  + hemorrhoids      Procedure for melanoma. dysplastic nevus 2018 + sunscreen routine dermatology follow-up needs the name of an alternative    BMI  eating fast food. .   Knee pain. GE reflux. No prediabetes. Elevated lipids.   Sleep apnea suspect    Lab Results   Component Value Date    CHOL 305 (H) 2021    CHOL 239 (H) 2019    CHOL 227 (H) 2018     Lab Results   Component Value Date    TRIG 109 2021    TRIG 58 2019    TRIG 102 2018       Lab Results   Component Value Date    LDLCALC 187 (H) 2021    LDLCALC 164 (H) 2020 LDLCALC 140 (H) 09/18/2019     Lab Results   Component Value Date    LABVLDL 22 02/18/2021    LABVLDL 20 08/19/2020    LABVLDL 12 09/18/2019     No results found for: CHOLHDLRATIO           PMH:    Uterine cyst    No pregnancy    endometrial biopsy    colonosopyc    [facility]      NKDA     SH: lives alone. No tobacco.  No pets wine 2 glasses nightly wine . 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 Passed away 4years ago and pt with grief. Work improved     FH: 2 siblings    + colon mom 74's ,  MI, melanoma      - mental illness SA      ROS:    Dry skin, chronic cough, glasses. Rare snoring. Breast cysts. Denies JHON. Hx blood in stool adenomatous polyp and tics. Recheck 2022 ., + knee pain, no edema,+ stones, vaginal bleeding with negative endometrial bx. . + grief. No skin cancers. Occasional sunburn. Wears a seatbelt. Denies any depression or anxiety currently. Has a smoke alarm and fire extinguisher. Date with Pap and mammograms. No recurrent urinary tract infections or urine concerns. No STDs. Amenorrheic. Hot flashes. No chronic postnasal drip.     Constitutional, ent, CV, respiratory, GI, , joint, skin, allergic and psychiatric ROS reviewed and negative except for above    Allergies   Allergen Reactions    Sammy Flavor        Outpatient Medications Marked as Taking for the 12/20/21 encounter (Office Visit) with Joe Bradshaw MD   Medication Sig Dispense Refill    fluticasone (FLONASE) 50 MCG/ACT nasal spray 1 spray by Each Nostril route daily      loratadine (CLARITIN) 10 MG capsule Take 10 mg by mouth daily      montelukast (SINGULAIR) 10 MG tablet TAKE 1 TABLET BY MOUTH EVERY DAY 60 tablet 2    triamterene-hydroCHLOROthiazide (MAXZIDE-25) 37.5-25 MG per tablet TAKE 1 TABLET BY MOUTH EVERY DAY 90 tablet 1    amLODIPine (NORVASC) 10 MG tablet TAKE 1 TABLET BY MOUTH DAILY TO REPLACE LOSARTAN 90 tablet 1             Past Medical History: Diagnosis Date    Asthma     Benign cyst of breast     Hypertension        Past Surgical History:   Procedure Laterality Date    COLONOSCOPY  12/2019    Dr. Darnell Stock    COLONOSCOPY N/A 12/5/2019    COLONOSCOPY POLYPECTOMY SNARE/COLD BIOPSY performed by Nicol Ellis MD at [address] CYST REMOVAL  2004             Family History   Problem Relation Age of Onset    Colon Cancer Mother     Other Father     Cancer Sister     Diabetes Brother         Melanoma         Objective     /72   Pulse 91   Resp 16   Ht 5' 8\" (1.727 m)   Wt 227 lb (103 kg)   SpO2 98% Comment: RA  BMI 34.52 kg/m²     @LASTSAO2(3)@    Wt Readings from Last 3 Encounters:   02/18/21 224 lb (101.6 kg)   11/02/20 231 lb (104.8 kg)   08/19/20 229 lb (103.9 kg)       Physical Exam     NAD alert and cooperative  HEENT: Throat is clear. Fair dentition. TMs are unremarkable. No adenopathy or thyromegaly. No anterior cervical masses. Lungs decreased breath sounds. Cough with deep inspiration. Cardiovascular exam regular rate and rhythm without any murmur click. Breast without any dominant masses discharge or dimpling. No intertriginous rash. Abdomen is obese no hepatosplenomegaly epigastric tenderness or mass noted. Good pulses lower extremities. No xanthomas. Multiple hyperpigmented nevi. None suspicious. Right skin and feeling. Mild ichthyosis  She is well-groomed, appropriate.     Chemistry        Component Value Date/Time     02/18/2021 1345    K 3.5 02/18/2021 1345    CL 96 (L) 02/18/2021 1345    CO2 29 02/18/2021 1345    BUN 11 02/18/2021 1345    CREATININE 0.9 02/18/2021 1345        Component Value Date/Time    CALCIUM 10.0 02/18/2021 1345    ALKPHOS 82 02/18/2021 1345    AST 28 02/18/2021 1345    ALT 29 02/18/2021 1345    BILITOT 0.5 02/18/2021 1345            Lab Results   Component Value Date    WBC 5.1 03/22/2017    HGB 14.4 03/22/2017    HCT 43.7 03/22/2017    MCV 87.0 03/22/2017  03/22/2017     Lab Results   Component Value Date    LABA1C 5.3 03/22/2017     Lab Results   Component Value Date    .4 03/22/2017     Lab Results   Component Value Date    LABA1C 5.3 03/22/2017     No components found for: CHLPL  Lab Results   Component Value Date    TRIG 109 02/18/2021    TRIG 58 09/18/2019    TRIG 102 05/22/2018     Lab Results   Component Value Date    HDL 96 (H) 02/18/2021    HDL 83 (H) 08/19/2020    HDL 87 (H) 09/18/2019     Lab Results   Component Value Date    LDLCALC 187 (H) 02/18/2021    LDLCALC 164 (H) 08/19/2020    LDLCALC 140 (H) 09/18/2019     Lab Results   Component Value Date    LABVLDL 22 02/18/2021    LABVLDL 20 08/19/2020    LABVLDL 12 09/18/2019       Old labs and records reviewed or requested  Discussed past lab and studies with patient      Diagnosis Orders   1. Chronic cough     2. Need for influenza vaccination  INFLUENZA, MDCK QUADV, 2 YRS AND OLDER, IM, PF, PREFILL SYR OR SDV, 0.5ML (FLUCELVAX QUADV, PF)   3. Pure hypercholesterolemia  Lipid Panel    TSH WITH REFLEX TO FT4   4. Obesity (BMI 30.0-34.9)     5. History of adenomatous polyp of colon     6. FH: colon cancer     7. History of melanoma     8. Primary hypertension  Basic Metabolic Panel   9. Elevated blood sugar  Hemoglobin A1C     Hypertension hyperlipidemia continue current medications repeat cholesterol. Consider statin. Obesity. Information on weight loss and low-cholesterol diet. History of adenomatous polyp recheck in 22. History of melanoma. Following with dermatology new when given. Elevated sugar A1c. General health maintenance. Advised on mammogram, follow-up GYN, getting Covid booster next week    Chronic cough. We will try gabapentin. Possible barium swallow with speech repeat. Hold off on Tessalon or further testing until we hear results of gabapentin.     On this date  I have spent 30 minutes reviewing previous notes, test results, and face to face with the

## 2021-12-20 NOTE — PATIENT INSTRUCTIONS
Below are a couple of dermatology offices, there is also one on the other side of this building [facility]  Ph [address] [facility]  Ph     20 minutes of exercise daily is your goal.  Continue with your sunscreen. Set up your mammogram for March  Consider sleep study, barium swallow. Let me know and I can order these when you are ready. You have 30 days worth of the gabapentin. Let me know if this is effective. You should get your lab test results within the week. If you do not please give our office a call. .  Low is a telephone number for free nutritionist if you wanted to call them about a low-cholesterol diet  [employer]   code [phone]    Patient Education        High Cholesterol: Care Instructions  Overview     Cholesterol is a type of fat in your blood. It is needed for many body functions, such as making new cells. Cholesterol is made by your body. It also comes from food you eat. High cholesterol means that you have too much of the fat in your blood. This raises your risk of a heart attack and stroke. LDL and HDL are part of your total cholesterol. LDL is the \"bad\" cholesterol. High LDL can raise your risk for coronary artery disease, heart attack, and stroke. HDL is the \"good\" cholesterol. It helps clear bad cholesterol from the body. High HDL is linked with a lower risk of coronary artery disease, heart attack, and stroke. Your cholesterol levels help your doctor find out your risk for having a heart attack or stroke. You and your doctor can talk about whether you need to lower your risk and what treatment is best for you. Treatment options include a heart-healthy lifestyle and medicine. Both options can help lower your cholesterol and your risk. The way you choose to lower your risk will depend on how high your risk is for heart attack and stroke. It will also depend on how you feel about taking medicines.   Follow-up care is a key part of your treatment and safety. Be sure to make and go to all appointments, and call your doctor if you are having problems. It's also a good idea to know your test results and keep a list of the medicines you take. How can you care for yourself at home? · Eat heart-healthy foods. ? Eat fruits, vegetables, whole grains, beans, and other high-fiber foods. ? Eat lean proteins, such as seafood, lean meats, beans, nuts, and soy products. ? Eat healthy fats, such as canola and olive oil. ? Choose foods that are low in saturated fat. ? Limit sodium and alcohol. ? Limit drinks and foods with added sugar. · Be physically active. Try to do moderate activity at least 2½ hours a week. Or try to do vigorous activity at least 1¼ hours a week. You may want to walk or try other activities, such as running, swimming, cycling, or playing tennis or team sports. · Stay at a healthy weight or lose weight by making the changes in eating and physical activity listed above. Losing just a small amount of weight, even 5 to 10 pounds, can help reduce your risk for having a heart attack or stroke. · Do not smoke. · Manage other health problems. These include diabetes and high blood pressure. If you think you may have a problem with alcohol or drug use, talk to your doctor. · If you take medicine, take it exactly as prescribed. Call your doctor if you think you are having a problem with your medicine. · Check with your doctor or pharmacist before you use any other medicines, including over-the-counter medicines. Make sure your doctor knows all of the medicines, vitamins, herbal products, and supplements you take. Taking some medicines together can cause problems. When should you call for help? Watch closely for changes in your health, and be sure to contact your doctor if:    · You need help making lifestyle changes.     · You have questions about your medicine. Where can you learn more?   Go to [email or web address] and sign in to your [employer] account. Enter N820 in the Cle[place]ize box to learn more about \"High Cholesterol: Care Instructions. \"     If you do not have an account, please click on the \"Sign Up Now\" link. Current as of: April 29, 2021               Content Version: 13.0  © 0999-7820 Healthwise, [facility]. Care instructions adapted under license by [facility] [facility] If you have questions about a medical condition or this instruction, always ask your healthcare professional. [place] [address] any warranty or liability for your use of this information.

## 2021-12-20 NOTE — PROGRESS NOTES
Vaccine Information Sheet, \"Influenza - Inactivated\"  given to Tesha Melton, or parent/legal guardian of  Tesha Melton and verbalized understanding. Patient responses:    Have you received any other vaccine within the last 14 days? No  Do you currently have an active infectious or acute respiratory illness or fever? No  Have you ever had a reaction to a flu vaccine? No  Do you have any current illness? No  Have you ever had Guillian Franklin Syndrome? No  Do you have a serious allergy to any of the follow: Neomycin, Polymyxin, Thimerosal, eggs or egg products? No      Flu vaccine given per order. Please see immunization tab. Risks and benefits explained. Current VIS given.       Immunizations Administered     Name Date Dose Route    Influenza, MDCK Quadv, IM, PF (Flucelvax 2 yrs and older) 12/20/2021 0.5 mL Intramuscular    Site: Deltoid- Right    Lot: [id number]    NDC: 25361-526-17

## 2021-12-21 ENCOUNTER — TELEPHONE (OUTPATIENT)
Dept: FAMILY MEDICINE CLINIC | Age: 56
End: 2021-12-21

## 2021-12-21 LAB
ESTIMATED AVERAGE GLUCOSE: 105.4 MG/DL
HBA1C MFR BLD: 5.3 %

## 2022-02-18 DIAGNOSIS — I10 ESSENTIAL HYPERTENSION: ICD-10-CM

## 2022-02-18 RX ORDER — AMLODIPINE BESYLATE 10 MG/1
10 TABLET ORAL DAILY
Qty: 60 TABLET | Refills: 2 | Status: SHIPPED | OUTPATIENT
Start: 2022-02-18 | End: 2022-09-26 | Stop reason: SDUPTHER

## 2022-03-17 DIAGNOSIS — I10 ESSENTIAL HYPERTENSION: ICD-10-CM

## 2022-03-17 RX ORDER — TRIAMTERENE AND HYDROCHLOROTHIAZIDE 37.5; 25 MG/1; MG/1
TABLET ORAL
Qty: 60 TABLET | Refills: 2 | Status: SHIPPED | OUTPATIENT
Start: 2022-03-17 | End: 2022-09-26 | Stop reason: SDUPTHER

## 2022-05-16 DIAGNOSIS — R05.3 CHRONIC COUGH: ICD-10-CM

## 2022-05-16 RX ORDER — MONTELUKAST SODIUM 10 MG/1
TABLET ORAL
Qty: 90 TABLET | Refills: 2 | Status: SHIPPED | OUTPATIENT
Start: 2022-05-16

## 2022-09-12 DIAGNOSIS — I10 ESSENTIAL HYPERTENSION: ICD-10-CM

## 2022-09-12 NOTE — TELEPHONE ENCOUNTER
Pt advised of message below.  She is going to contact [place] office to schedule and will call back with date

## 2022-09-12 NOTE — TELEPHONE ENCOUNTER
Overdue 6 month follow up bp. I can bridge until apt with me or new provider.      MD Beth Charles MD  [facility] internal Medicine  [address] [facility] [address]  [address] scheduling  [phone]

## 2022-09-26 ENCOUNTER — OFFICE VISIT (OUTPATIENT)
Dept: INTERNAL MEDICINE CLINIC | Age: 57
End: 2022-09-26
Payer: COMMERCIAL

## 2022-09-26 VITALS
WEIGHT: 233 LBS | OXYGEN SATURATION: 97 % | DIASTOLIC BLOOD PRESSURE: 82 MMHG | TEMPERATURE: 98.2 F | HEART RATE: 80 BPM | SYSTOLIC BLOOD PRESSURE: 118 MMHG | BODY MASS INDEX: 35.43 KG/M2

## 2022-09-26 DIAGNOSIS — R79.89 ELEVATED TSH: ICD-10-CM

## 2022-09-26 DIAGNOSIS — E78.00 PURE HYPERCHOLESTEROLEMIA: ICD-10-CM

## 2022-09-26 DIAGNOSIS — Z76.89 ENCOUNTER TO ESTABLISH CARE: ICD-10-CM

## 2022-09-26 DIAGNOSIS — Z00.00 PREVENTATIVE HEALTH CARE: ICD-10-CM

## 2022-09-26 DIAGNOSIS — Z85.820 HISTORY OF MELANOMA: ICD-10-CM

## 2022-09-26 DIAGNOSIS — R05.3 CHRONIC COUGH: ICD-10-CM

## 2022-09-26 DIAGNOSIS — Z12.31 ENCOUNTER FOR SCREENING MAMMOGRAM FOR MALIGNANT NEOPLASM OF BREAST: ICD-10-CM

## 2022-09-26 DIAGNOSIS — G47.39 SLEEP APNEA-LIKE BEHAVIOR: ICD-10-CM

## 2022-09-26 DIAGNOSIS — I10 ESSENTIAL HYPERTENSION: Primary | ICD-10-CM

## 2022-09-26 LAB
ALBUMIN SERPL-MCNC: 4.4 G/DL (ref 3.4–5)
ALP BLD-CCNC: 87 U/L (ref 40–129)
ALT SERPL-CCNC: 24 U/L (ref 10–40)
ANION GAP SERPL CALCULATED.3IONS-SCNC: 12 MMOL/L (ref 3–16)
AST SERPL-CCNC: 23 U/L (ref 15–37)
BILIRUB SERPL-MCNC: 0.4 MG/DL (ref 0–1)
BILIRUBIN DIRECT: <0.2 MG/DL (ref 0–0.3)
BILIRUBIN, INDIRECT: NORMAL MG/DL (ref 0–1)
BUN BLDV-MCNC: 16 MG/DL (ref 7–20)
CALCIUM SERPL-MCNC: 9.6 MG/DL (ref 8.3–10.6)
CHLORIDE BLD-SCNC: 98 MMOL/L (ref 99–110)
CHOLESTEROL, FASTING: 263 MG/DL (ref 0–199)
CO2: 29 MMOL/L (ref 21–32)
CREAT SERPL-MCNC: 0.8 MG/DL (ref 0.6–1.1)
GFR AFRICAN AMERICAN: >60
GFR NON-AFRICAN AMERICAN: >60
GLUCOSE BLD-MCNC: 94 MG/DL (ref 70–99)
HDLC SERPL-MCNC: 90 MG/DL (ref 40–60)
LDL CHOLESTEROL CALCULATED: 160 MG/DL
POTASSIUM SERPL-SCNC: 4.2 MMOL/L (ref 3.5–5.1)
SODIUM BLD-SCNC: 139 MMOL/L (ref 136–145)
TOTAL PROTEIN: 6.9 G/DL (ref 6.4–8.2)
TRIGLYCERIDE, FASTING: 65 MG/DL (ref 0–150)
TSH REFLEX: 3.1 UIU/ML (ref 0.27–4.2)
VLDLC SERPL CALC-MCNC: 13 MG/DL

## 2022-09-26 PROCEDURE — 99214 OFFICE O/P EST MOD 30 MIN: CPT | Performed by: NURSE PRACTITIONER

## 2022-09-26 RX ORDER — AMLODIPINE BESYLATE 10 MG/1
10 TABLET ORAL DAILY
Qty: 90 TABLET | Refills: 1 | Status: SHIPPED | OUTPATIENT
Start: 2022-09-26 | End: 2022-12-25

## 2022-09-26 RX ORDER — OMEPRAZOLE 20 MG/1
20 CAPSULE, DELAYED RELEASE ORAL DAILY
COMMUNITY

## 2022-09-26 RX ORDER — AMLODIPINE BESYLATE 10 MG/1
10 TABLET ORAL DAILY
Qty: 60 TABLET | Refills: 2 | OUTPATIENT
Start: 2022-09-26

## 2022-09-26 RX ORDER — TRIAMTERENE AND HYDROCHLOROTHIAZIDE 37.5; 25 MG/1; MG/1
1 TABLET ORAL DAILY
Qty: 90 TABLET | Refills: 1 | Status: SHIPPED | OUTPATIENT
Start: 2022-09-26

## 2022-09-26 RX ORDER — TRIAMTERENE AND HYDROCHLOROTHIAZIDE 37.5; 25 MG/1; MG/1
TABLET ORAL
Qty: 60 TABLET | Refills: 2 | OUTPATIENT
Start: 2022-09-26

## 2022-09-26 SDOH — ECONOMIC STABILITY: FOOD INSECURITY: WITHIN THE PAST 12 MONTHS, THE FOOD YOU BOUGHT JUST DIDN'T LAST AND YOU DIDN'T HAVE MONEY TO GET MORE.: NEVER TRUE

## 2022-09-26 SDOH — ECONOMIC STABILITY: FOOD INSECURITY: WITHIN THE PAST 12 MONTHS, YOU WORRIED THAT YOUR FOOD WOULD RUN OUT BEFORE YOU GOT MONEY TO BUY MORE.: NEVER TRUE

## 2022-09-26 ASSESSMENT — PATIENT HEALTH QUESTIONNAIRE - PHQ9
SUM OF ALL RESPONSES TO PHQ QUESTIONS 1-9: 0
1. LITTLE INTEREST OR PLEASURE IN DOING THINGS: 0
2. FEELING DOWN, DEPRESSED OR HOPELESS: 0
SUM OF ALL RESPONSES TO PHQ QUESTIONS 1-9: 0
SUM OF ALL RESPONSES TO PHQ9 QUESTIONS 1 & 2: 0

## 2022-09-26 ASSESSMENT — ENCOUNTER SYMPTOMS
COUGH: 1
VOICE CHANGE: 0
SORE THROAT: 0
CONSTIPATION: 0
ABDOMINAL PAIN: 0
WHEEZING: 0
SHORTNESS OF BREATH: 0
RHINORRHEA: 1
TROUBLE SWALLOWING: 0

## 2022-09-26 ASSESSMENT — SOCIAL DETERMINANTS OF HEALTH (SDOH): HOW HARD IS IT FOR YOU TO PAY FOR THE VERY BASICS LIKE FOOD, HOUSING, MEDICAL CARE, AND HEATING?: NOT HARD AT ALL

## 2022-09-26 NOTE — PROGRESS NOTES
Date: 2022                                               Subjective/Objective:     Chief Complaint   Patient presents with    [facility]       HPI    Roxana Cotton is a 59 yo female, visit today to [facility]. Former patient of Dr Yuridia Hawkins. She denies concerns today. Hypertension managed on amlodipine and triamterene/hydrochlorothiazide. She does not monitor blood pressure at home. She denies chest pain, shortness of breath and headache. Asthma/Allergic rhinitis managed on Flonase and Singulair. She denies medication side effects. Chronic cough that began per chart . Dry, non productive cough. No time of day that it is worse. Waxes and wanes in severity. Will cough for no apparent reason - not related to activity - her nose will run and will cough 5 minutes or less. Will usually use a cough drop which does help. Takes flonase, singulair and claritin regularly. Previous work-up is included chest x-ray, CT chest, lower endoscopy, PFT, allergy testing and unresponsive to PPI and bronchodilators previously. She was previously stopped on ACE to ARB with no improvement. Was prescribed gabapentin previously however was apprehensive to start. Last saw ENT 2020 who felt this was LPR, however per pt GI did not feel like this was acid reflux. She does continue to take omeprazole, 20 mg daily. She does not feel this has changed her cough. GI had previously recommend PH/impedence testing if no improvement. Previously referred to sleep medicine as she does snore. Denies daytime fatigue/headaches. Some chronic throat clearing. She doesn't feel like she has post nasal drainage however she does get a tickle at times. [place]ology [place] who told her it was stress related. She denies wheezing, SOB. H/o melanoma. S/p MOHS left foot 2021.  Would like new dermatology referral due to location of her dermatologist.     Colon cancer screenin2019 with 3 year recall  Gyn:               Last pap smear: needs               Last Mammogram: needs              GYN care managed by: needs referral - was being seen in [address]:  never smoker. Daily alcohol use.    Exercise: no             Patient Active Problem List    Diagnosis Date Noted    History of melanoma 12/20/2021    Dysplastic nevi 03/29/2021    Melanoma in situ ([facility] [address] 03/29/2021    History of adenomatous polyp of colon 12/06/2019    Essential hypertension 03/22/2017    Asthma 03/22/2017    Chronic cough 03/22/2017    FH: colon cancer 03/22/2017    FH: melanoma 03/22/2017    Obesity (BMI 30.0-34.9) 03/22/2017    Grief 03/22/2017    Pure hypercholesterolemia 03/22/2017       Past Medical History:   Diagnosis Date    Asthma     Benign cyst of breast     Chronic cough     History of melanoma 12/20/2021    Hyperlipidemia     Hypertension        Past Surgical History:   Procedure Laterality Date    COLONOSCOPY  12/2019    Dr. Kendra Sher    COLONOSCOPY N/A 12/05/2019    COLONOSCOPY POLYPECTOMY SNARE/COLD BIOPSY performed by Leela Tilley MD at [address]  2004    OTHER SURGICAL HISTORY      melanoma left foot       Office Visit on 12/20/2021   Component Date Value Ref Range Status    Cholesterol, Total 12/20/2021 251 (A)  0 - 199 mg/dL Final    Triglycerides 12/20/2021 74  0 - 150 mg/dL Final    HDL 12/20/2021 95 (A)  40 - 60 mg/dL Final    LDL Calculated 12/20/2021 141 (A)  <100 mg/dL Final    VLDL Cholesterol Calculated 12/20/2021 15  Not Established mg/dL Final    Hemoglobin A1C 12/20/2021 5.3  See comment % Final    Comment: Comment:  Diagnosis of Diabetes: > or = 6.5%  Increased risk of diabetes (Prediabetes): 5.7-6.4%  Glycemic Control: Nonpregnant Adults: <7.0%                    Pregnant: <6.0%        eAG 12/20/2021 105.4  mg/dL Final    TSH Reflex FT4 12/20/2021 5.85 (A)  0.27 - 4.20 uIU/mL Final    Sodium 12/20/2021 142  136 - 145 mmol/L Final    Potassium 12/20/2021 3.7 3.5 - 5.1 mmol/L Final    Chloride 12/20/2021 98 (A)  99 - 110 mmol/L Final    CO2 12/20/2021 29  21 - 32 mmol/L Final    Anion Gap 12/20/2021 15  3 - 16 Final    Glucose 12/20/2021 96  70 - 99 mg/dL Final    BUN 12/20/2021 15  7 - 20 mg/dL Final    Creatinine 12/20/2021 0.8  0.6 - 1.1 mg/dL Final    GFR Non- 12/20/2021 >60  >60 Final    Comment: >60 mL/min/1.73m2 EGFR, calc. for ages 25 and older using the  MDRD formula (not corrected for weight), is valid for stable  renal function. GFR  12/20/2021 >60  >60 Final    Comment: Chronic Kidney Disease: less than 60 ml/min/1.73 sq.m. Kidney Failure: less than 15 ml/min/1.73 sq.m. Results valid for patients 18 years and older. Calcium 12/20/2021 9.7  8.3 - 10.6 mg/dL Final    T4 Free 12/20/2021 1.1  0.9 - 1.8 ng/dL Final       Family History   Problem Relation Age of Onset    Colon Cancer Mother     Other Father     Dementia Father     Cancer Sister         melanoma    Diabetes Brother         Melanoma       Current Outpatient Medications   Medication Sig Dispense Refill    omeprazole (PRILOSEC) 20 MG delayed release capsule Take 20 mg by mouth daily      amLODIPine (NORVASC) 10 MG tablet Take 1 tablet by mouth daily 90 tablet 1    triamterene-hydroCHLOROthiazide (MAXZIDE-25) 37.5-25 MG per tablet Take 1 tablet by mouth daily 90 tablet 1    montelukast (SINGULAIR) 10 MG tablet TAKE 1 TABLET BY MOUTH EVERY DAY 90 tablet 2    fluticasone (FLONASE) 50 MCG/ACT nasal spray 1 spray by Each Nostril route daily      loratadine (CLARITIN) 10 MG capsule Take 10 mg by mouth daily       No current facility-administered medications for this visit. Allergies   Allergen Reactions    [place] Flavor        Review of Systems   Constitutional:  Negative for chills and fever. HENT:  Positive for rhinorrhea. Negative for postnasal drip, sore throat, trouble swallowing and voice change. Respiratory:  Positive for cough.  Negative for shortness of breath and wheezing. Cardiovascular:  Negative for chest pain and leg swelling. Gastrointestinal:  Negative for abdominal pain and constipation. Genitourinary:  Negative for difficulty urinating. Musculoskeletal:  Negative for arthralgias and myalgias. Neurological:  Negative for dizziness and syncope. Vitals:  /82 (Site: Left Upper Arm, Position: Sitting, Cuff Size: Large Adult)   Pulse 80   Temp 98.2 °F (36.8 °C) (Oral)   Wt 233 lb (105.7 kg)   SpO2 97%   BMI 35.43 kg/m²     Physical Exam  Vitals reviewed. Constitutional:       General: She is not in acute distress. HENT:      Head: Normocephalic and atraumatic. Cardiovascular:      Rate and Rhythm: Normal rate and regular rhythm. Heart sounds: Normal heart sounds. Pulmonary:      Effort: Pulmonary effort is normal. No respiratory distress. Breath sounds: Normal breath sounds. No wheezing. Abdominal:      General: Bowel sounds are normal. There is no distension. Palpations: Abdomen is soft. Tenderness: There is no abdominal tenderness. Musculoskeletal:      Right lower leg: No edema. Left lower leg: No edema. Skin:     General: Skin is warm and dry. Neurological:      General: No focal deficit present. Mental Status: She is alert and oriented to person, place, and time. Psychiatric:         Mood and Affect: Mood normal.         Behavior: Behavior normal.         Thought Content: Thought content normal.         Judgment: Judgment normal.       Assessment/Plan     1. Encounter to establish care    2. Essential hypertension: controlled  - Basic Metabolic Panel; Future  - amLODIPine (NORVASC) 10 MG tablet; Take 1 tablet by mouth daily  Dispense: 90 tablet; Refill: 1  - triamterene-hydroCHLOROthiazide (MAXZIDE-25) 37.5-25 MG per tablet; Take 1 tablet by mouth daily  Dispense: 90 tablet; Refill: 1   - Continue current medications   - Follow up 6 months    3.  Chronic cough: per chart review and pt has had previous extensive workup with allergy testing, ENT evaluation, GI evaluation. Is taking PPI for possible GERD contributing. She will continue current medications and see pulmonology. - Army Kathleen MD, Pulmonary, [facility]    4. Pure hypercholesterolemia: not on medications  - Lipid, Fasting; Future  - Hepatic Function Panel; Future   - Check fasting lipid panel and adjust plan as necessary    5. Elevated TSH  - TSH with Reflex; Future    6. History of melanoma  - External Referral To Dermatology    7. Sleep apnea-like behavior  - Karyna Daley MD, Sleep Medicine, [facility]    8. Encounter for screening mammogram for malignant neoplasm of breast  - RUBEN DIGITAL SCREEN W OR WO CAD BILATERAL; Future    9.  Preventative health care  - Ming Hill MD, Gynecology, [facility]   - Regular exercise advised     Orders Placed This Encounter   Procedures    RUBEN DIGITAL SCREEN W OR WO CAD BILATERAL     Standing Status:   Future     Standing Expiration Date:   11/26/2023     Order Specific Question:   Reason for exam:     Answer:   breast cancer screening    Basic Metabolic Panel     Standing Status:   Future     Number of Occurrences:   1     Standing Expiration Date:   9/26/2023    TSH with Reflex     Standing Status:   Future     Number of Occurrences:   1     Standing Expiration Date:   9/26/2023    Lipid, Fasting     Standing Status:   Future     Number of Occurrences:   1     Standing Expiration Date:   9/26/2023    Hepatic Function Panel     Standing Status:   Future     Number of Occurrences:   1     Standing Expiration Date:   9/26/2023    External Referral To Dermatology     Referral Priority:   Routine     Referral Type:   Eval and Treat     Referral Reason:   Specialty Services Required     Requested Specialty:   Dermatology     Number of Visits Requested:   [address] Zada Daring, MD, Sleep Medicine, [facility]     Referral Priority:   Routine     Referral Type:   Eval and Treat     Referral Reason:   Specialty Services Required     Referred to Provider:   Abby Erazo MD     Requested Specialty:   Sleep Medicine Family Practice     Number of Visits Requested:   Lakshmi Garza MD, Pulmonary, [facility]     Referral Priority:   Routine     Referral Type:   Eval and Treat     Referral Reason:   Specialty Services Required     Referred to Provider:   Kera Fox MD     Requested Specialty:   Pulmonary Disease     Number of Visits Requested:   David Reese MD, Gynecology, [facility]     Referral Priority:   Routine     Referral Type:   Eval and Treat     Referral Reason:   Specialty Services Required     Referred to Provider:   Marie Mayfield MD     Requested Specialty:   Obstetrics & Gynecology     Number of Visits Requested:   1       Return in about 6 months (around 3/26/2023) for hypertension. OR sooner with questions, concerns, worsening symptoms    ISRAEL ACOSTA  9/26/2022  12:01 PM    Discussed use, benefit, and side effects of prescribed medications. Barriers to medication compliance addressed. Discussed all ordered testing and labs. All patient questions answered. Patient agreeable with plan above. Please note that this chart was generated using dragon dictation software. Although every effort was made to ensure the accuracy of this automated transcription, some errors in transcription may have occurred.

## 2022-09-26 NOTE — PATIENT INSTRUCTIONS
Colonoscopy December  Schedule with pulmonologist  Schedule with sleep medicine  Dermatologist     Mammogram  Schedule with gynecology   Goal is to exercise (moderate intensity) for at least 150 min a week.  Exercising at least 3-4 days a week is best.

## 2022-11-08 ENCOUNTER — OFFICE VISIT (OUTPATIENT)
Dept: PULMONOLOGY | Age: 57
End: 2022-11-08
Payer: COMMERCIAL

## 2022-11-08 VITALS
SYSTOLIC BLOOD PRESSURE: 120 MMHG | WEIGHT: 232.2 LBS | HEIGHT: 68 IN | BODY MASS INDEX: 35.19 KG/M2 | DIASTOLIC BLOOD PRESSURE: 80 MMHG | RESPIRATION RATE: 16 BRPM | OXYGEN SATURATION: 97 % | HEART RATE: 91 BPM | TEMPERATURE: 97.7 F

## 2022-11-08 DIAGNOSIS — E66.9 OBESITY (BMI 30.0-34.9): ICD-10-CM

## 2022-11-08 DIAGNOSIS — R05.3 CHRONIC COUGH: ICD-10-CM

## 2022-11-08 DIAGNOSIS — J30.89 OTHER ALLERGIC RHINITIS: Primary | ICD-10-CM

## 2022-11-08 DIAGNOSIS — J45.909 ASTHMA, UNSPECIFIED ASTHMA SEVERITY, UNSPECIFIED WHETHER COMPLICATED, UNSPECIFIED WHETHER PERSISTENT: ICD-10-CM

## 2022-11-08 PROCEDURE — 3078F DIAST BP <80 MM HG: CPT | Performed by: INTERNAL MEDICINE

## 2022-11-08 PROCEDURE — 3074F SYST BP LT 130 MM HG: CPT | Performed by: INTERNAL MEDICINE

## 2022-11-08 PROCEDURE — 99205 OFFICE O/P NEW HI 60 MIN: CPT | Performed by: INTERNAL MEDICINE

## 2022-11-08 RX ORDER — IPRATROPIUM BROMIDE AND ALBUTEROL SULFATE 2.5; .5 MG/3ML; MG/3ML
1 SOLUTION RESPIRATORY (INHALATION) EVERY 4 HOURS
Qty: 360 ML | Refills: 11 | Status: SHIPPED | OUTPATIENT
Start: 2022-11-08

## 2022-11-08 RX ORDER — BUDESONIDE 0.5 MG/2ML
1 INHALANT ORAL 2 TIMES DAILY
Qty: 60 EACH | Refills: 11 | Status: SHIPPED | OUTPATIENT
Start: 2022-11-08

## 2022-11-08 RX ORDER — AZELASTINE 1 MG/ML
1 SPRAY, METERED NASAL 2 TIMES DAILY
Qty: 60 ML | Refills: 4 | Status: SHIPPED | OUTPATIENT
Start: 2022-11-08

## 2022-11-08 NOTE — ASSESSMENT & PLAN NOTE
Patient has a chart diagnosis of asthma but this is unsubstantiated on her pulmonary function test in 2007 and in office spirometry x4 in 2014 2015. It is interesting that her in office spirometry is not consistent with obstruction with concurrent reductions in FEV1 and FVC down to approximately 60% consistent with restriction. These were not seen in 2007 with a normal CT chest in 2007. I suspect this is a result of poor quality of in office spirometry rather than true pathology.

## 2022-11-08 NOTE — ASSESSMENT & PLAN NOTE
Patient has a history of chronic cough with extensive treatments. On today's exam, she has significant sinus congestion on the right with enlarged nasal turbinate. Therefore, this is presumed to be the etiology of her cough. Given her anatomy, nasal sprays will be slightly difficult. She has nebulizer. Therefore, she will nebulized DuoNebs and budesonide twice daily via mask breathing to nose for thorough sinus treatment. She will continue Singulair and Claritin. She will also add Astelin/Astepro 2 sprays twice daily. Specific technique given to her given her nasal anatomy. She will follow-up in 1 month. If no improvement, consider additional testing such as pulmonary function test with methacholine challenge to assess for asthma and a CT chest with CT sinuses. There is a consideration for sleep apnea possibly causing cough. Without other signs of sleep apnea, will hold on sleep study at this time. Greater than 60 minutes spent with extensive chart review prior to visit.

## 2022-11-08 NOTE — PATIENT INSTRUCTIONS
Astepro = Astelin 2 spray each side twice daily     budesonide neb twice daily   Duoneb's  neb twice daily     Continue   Singulair nightly   Claritin daily      [employer]   mask nebulizer adult

## 2022-11-08 NOTE — PROGRESS NOTES
REASON FOR CONSULTATION/CC:    Chief Complaint   Patient presents with    New Patient    Cough        Consult at request of   ISRAEL Mckeon for  cough    PCP: ISRAEL Mckeon    HISTORY OF PRESENT ILLNESS: Lolis Ochoa is a 59y.o. year old female with a history of   who presents        History  Patient has had a chronic cough since at least 2007 with work-up from a primary care, pulmonology, ENT, allergy. She has been on a myriad of medications for sinus, allergy, GERD without significant change. Pulmonary function test 2007 was normal without bronchodilator response with an FEV1 of 92%. Patient has had for in office spirometry since, 2015 2016 all demonstrating an FEV1 60% ±5%, without bronchodilator response, without obstruction. Methacholine challenge not completed per review of epic  Respiratory allergen profile completed 2015 all normal.  IgE low. Eosinophils of 0.1. She has been treated with gabapentin without response. Current history  She continues to cough and referred for a sleep evaluation secondary to cough. Sleep  Part of DDx of cough is MAGDALENE but not snoring, naps, or apnea. No gasping for air. Does patient does not believe she has sleep apnea        Cough. This has been going on for ~ 20 years. States the cough is sporadic, dry, with \"tinkle in throat\". States \"because I cough my nose runs\"  Will cough with moving in the morning. With talking. Coughing on daily. No smoker. No seasonal change. Works in sales. No hot tube. No cat or dog.            REVIEW OF SYSTEMS:  Constitutional: Negative for fever    HENT: Negative for sore throat  Eyes: Negative for redness   Respiratory: Negative for dyspnea, +++ cough  Cardiovascular: Negative for chest pain  Gastrointestinal: Negative for vomiting, diarrhea   Genitourinary: Negative for hematuria   Musculoskeletal: Negative for arthralgias   Skin: Negative for rash  Neurological: Negative for syncope  Hematological: Negative for adenopathy  Psychiatric/Behavorial: Negative for anxiety      SOCIAL HISTORY:   reports that she has never smoked. She has never used smokeless tobacco.    PAST MEDICAL HISTORY:  Past Medical History:   Diagnosis Date    Asthma     Benign cyst of breast     Chronic cough     History of melanoma 12/20/2021    Hyperlipidemia     Hypertension        PAST SURGICAL HISTORY:  Past Surgical History:   Procedure Laterality Date    COLONOSCOPY  12/2019    Dr. Aman Cordoba    COLONOSCOPY N/A 12/05/2019    COLONOSCOPY POLYPECTOMY SNARE/COLD BIOPSY performed by Pawel Rodriguez MD at [address] [place]  2004    OTHER SURGICAL HISTORY      melanoma left foot       FAMILY HISTORY:  family history includes Cancer in her sister; Colon Cancer in her mother; Dementia in her father; Diabetes in her brother; Other in her father. Objective:   PHYSICAL EXAM:  Blood pressure 120/80, pulse 91, temperature 97.7 °F (36.5 °C), temperature source Infrared, resp. rate 16, height 5' 8\" (1.727 m), weight 232 lb 3.2 oz (105.3 kg), SpO2 97 %.'  Gen: No distress. Eyes: PERRL. No sclera icterus. No conjunctival injection. ENT: No discharge. Pharynx clear. External appearance of ears and nose normal.  Enlarged nasal turbinate on the right with secretions  Neck: Trachea midline. No obvious mass. Resp: No accessory muscle use. No crackles. No wheezes. No rhonchi. CV: Regular rate. Regular rhythm. No murmur or rub. No edema. GI:    Skin: Warm, dry, normal texture and turgor. No nodule on exposed extremities. Lymph: No cervical LAD. No supraclavicular LAD. M/S: No cyanosis. No clubbing. No joint deformity. Neuro: Moves all four extremities. Psych: Oriented x 3. No anxiety. Awake. Alert. Intact judgement and insight.     Current Outpatient Medications   Medication Sig Dispense Refill    budesonide (PULMICORT) 0.5 MG/2ML nebulizer suspension Take 2 mLs by nebulization 2 times daily 60 each 11    ipratropium-albuterol (DUONEB) 0.5-2.5 (3) MG/3ML SOLN nebulizer solution Inhale 3 mLs into the lungs every 4 hours 360 mL 11    azelastine (ASTELIN) 0.1 % nasal spray 1 spray by Nasal route 2 times daily Use in each nostril as directed 60 mL 4    omeprazole (PRILOSEC) 20 MG delayed release capsule Take 20 mg by mouth daily      amLODIPine (NORVASC) 10 MG tablet Take 1 tablet by mouth daily 90 tablet 1    triamterene-hydroCHLOROthiazide (MAXZIDE-25) 37.5-25 MG per tablet Take 1 tablet by mouth daily 90 tablet 1    montelukast (SINGULAIR) 10 MG tablet TAKE 1 TABLET BY MOUTH EVERY DAY 90 tablet 2    loratadine (CLARITIN) 10 MG capsule Take 10 mg by mouth daily       No current facility-administered medications for this visit. Data Reviewed:     Category 1 Data points:      Last CBC  Lab Results   Component Value Date/Time    WBC 5.1 03/22/2017 09:57 AM    RBC 5.02 03/22/2017 09:57 AM    HGB 14.4 03/22/2017 09:57 AM    MCV 87.0 03/22/2017 09:57 AM     03/22/2017 09:57 AM     Last Renal  Lab Results   Component Value Date/Time     09/26/2022 09:16 AM    K 4.2 09/26/2022 09:16 AM    CL 98 09/26/2022 09:16 AM    CO2 29 09/26/2022 09:16 AM    CO2 29 12/20/2021 01:26 PM    CO2 29 02/18/2021 01:45 PM    BUN 16 09/26/2022 09:16 AM    CREATININE 0.8 09/26/2022 09:16 AM    GLUCOSE 94 09/26/2022 09:16 AM    CALCIUM 9.6 09/26/2022 09:16 AM       Last ABG  POC Blood Gas: No results found for: POCPH, POCPCO2, POCPO2, POCHCO3, NBEA, JDKT7CMP  No results for input(s): PH, PCO2, PO2, HCO3, BE, O2SAT in the last 72 hours. Notes Reviewed: Extensive chart review. See HPI. Radiology Review:  Pertinent images / reports were reviewed as a part of this visit. CT Chest w/ contrast: No results found for this or any previous visit. CT Chest w/o contrast: No results found for this or any previous visit. CTPA: No results found for this or any previous visit.       CXR PA/LAT: Results for orders placed during the hospital encounter of 05/05/17    XR Chest Standard TWO VW    Narrative  EXAMINATION:  TWO VIEWS OF THE CHEST    5/5/2017 1:30 pm    COMPARISON:  None. HISTORY:  ORDERING PHYSICIAN PROVIDED HISTORY: Chronic cough  TECHNOLOGIST PROVIDED HISTORY:  Technologist Provided Reason for Exam: Chronic cough  Acuity: Chronic  Type of Encounter: Ongoing  Relevant Medical/Surgical History: has had a dry cough for years;nothing  seems to help    FINDINGS:  Cardiac silhouette is normal in size. Lungs are clear. No acute bony  abnormality. Impression  No acute findings. CXR portable: No results found for this or any previous visit. Total labs reviewed 3+    Category 2 Data points:    Radiology Review:  Independent interpretation of normal chest x-ray 2017    Category 3 Data points:    Discussed management or interpretation of test with external provider:            Assessment:     Chronic cough  Enlarged nasal turbinates right  Postnasal drip  Chart diagnosis of asthma, restriction on pulmonary function test 2015 2016      Plan:      Problem List Items Addressed This Visit       Obesity (BMI 30.0-34.9)      Uncontrolled. Chronic cough      Patient has a history of chronic cough with extensive treatments. On today's exam, she has significant sinus congestion on the right with enlarged nasal turbinate. Therefore, this is presumed to be the etiology of her cough. Given her anatomy, nasal sprays will be slightly difficult. She has nebulizer. Therefore, she will nebulized DuoNebs and budesonide twice daily via mask breathing to nose for thorough sinus treatment. She will continue Singulair and Claritin. She will also add Astelin/Astepro 2 sprays twice daily. Specific technique given to her given her nasal anatomy. She will follow-up in 1 month.     If no improvement, consider additional testing such as pulmonary function test with methacholine challenge to assess for asthma and a CT chest with CT sinuses. There is a consideration for sleep apnea possibly causing cough. Without other signs of sleep apnea, will hold on sleep study at this time. Greater than 60 minutes spent with extensive chart review prior to visit. Asthma     Patient has a chart diagnosis of asthma but this is unsubstantiated on her pulmonary function test in 2007 and in office spirometry x4 in 2014 2015. It is interesting that her in office spirometry is not consistent with obstruction with concurrent reductions in FEV1 and FVC down to approximately 60% consistent with restriction. These were not seen in 2007 with a normal CT chest in 2007. I suspect this is a result of poor quality of in office spirometry rather than true pathology. Relevant Medications    budesonide (PULMICORT) 0.5 MG/2ML nebulizer suspension    ipratropium-albuterol (DUONEB) 0.5-2.5 (3) MG/3ML SOLN nebulizer solution     Other Visit Diagnoses       Other allergic rhinitis    -  Primary    Relevant Medications    budesonide (PULMICORT) 0.5 MG/2ML nebulizer suspension    ipratropium-albuterol (DUONEB) 0.5-2.5 (3) MG/3ML SOLN nebulizer solution    azelastine (ASTELIN) 0.1 % nasal spray                  This note was transcribed using [id number] [place] [employer]. Please disregard any translational errors.     Sarah [phone]

## 2022-12-24 DIAGNOSIS — J30.89 OTHER ALLERGIC RHINITIS: ICD-10-CM

## 2022-12-27 RX ORDER — BUDESONIDE 0.5 MG/2ML
INHALANT ORAL
Qty: 360 ML | Refills: 3 | Status: SHIPPED | OUTPATIENT
Start: 2022-12-27

## 2023-02-18 DIAGNOSIS — I10 ESSENTIAL HYPERTENSION: ICD-10-CM

## 2023-02-20 RX ORDER — AMLODIPINE BESYLATE 10 MG/1
TABLET ORAL
Qty: 90 TABLET | Refills: 1 | Status: SHIPPED | OUTPATIENT
Start: 2023-02-20

## 2023-02-20 RX ORDER — TRIAMTERENE AND HYDROCHLOROTHIAZIDE 37.5; 25 MG/1; MG/1
TABLET ORAL
Qty: 90 TABLET | Refills: 1 | Status: SHIPPED | OUTPATIENT
Start: 2023-02-20

## 2023-02-20 NOTE — TELEPHONE ENCOUNTER
Last office visit 9/26/22        Future Appointments   Date Time Provider Kacey Ahuja   3/27/2023  8:45 AM [address]

## 2023-07-10 DIAGNOSIS — J30.89 OTHER ALLERGIC RHINITIS: ICD-10-CM

## 2023-07-10 RX ORDER — AZELASTINE HYDROCHLORIDE 137 UG/1
SPRAY, METERED NASAL
Qty: 30 ML | Refills: 3 | Status: SHIPPED | OUTPATIENT
Start: 2023-07-10

## 2023-07-21 ENCOUNTER — ANESTHESIA EVENT (OUTPATIENT)
Dept: ENDOSCOPY | Age: 58
End: 2023-07-21
Payer: COMMERCIAL

## 2023-07-21 NOTE — PROGRESS NOTES
[address]  time_______0900_____        Surgery time________1030____    Take the following medications with a sip of water: Follow your MD/Surgeons pre-procedure instructions regarding your medications     Do not eat or drink anything after 12:00 midnight prior to your surgery. This includes water chewing gum, mints and ice chips. You may brush your teeth and gargle the morning of your surgery, but do not swallow the water     Please see your family doctor/pediatrician for a history and physical and/or concerning medications. Bring any test results/reports from your physicians office. If you are under the care of a heart doctor or specialist doctor, please be aware that you may be asked to them for clearance    You may be asked to stop blood thinners such as Coumadin, Plavix, Fragmin, Lovenox, etc., or any anti-inflammatories such as:  Aspirin, Ibuprofen, Advil, Naproxen prior to your surgery. We also ask that you stop any OTC medications such as fish oil, vitamin E, glucosamine, garlic, Multivitamins, COQ 10, etc.    We ask that you do not smoke 24 hours prior to surgery  We ask that you do not  drink any alcoholic beverages 24 hours prior to surgery     You must make arrangements for a responsible adult to take you home after your surgery. For your safety you will not be allowed to leave alone or drive yourself home. Your surgery will be cancelled if you do not have a ride home. Also for your safety, it is strongly suggested that someone stay with you the first 24 hours after your surgery. A parent or legal guardian must accompany a child scheduled for surgery and plan to stay at the hospital until the child is discharged. Please do not bring other children with you. For your comfort, please wear simple loose fitting clothing to the hospital.  Please do not bring valuables.     Do not wear any make-up or nail polish on your fingers or

## 2023-07-21 NOTE — PROGRESS NOTES
[facility] Pre-Admission Testing Electronic Communication Worksheet for OR/ENDO Procedures        Patient: Pascual Almonte    DOS: 7/24    Arrival Time: 0900    Surgery Time:1030    Meds to Bed:  [] YES    []  NO    Transportation Confirmed: [] YES    [x]  NO    History and Physical:  [x] YES    []  NO  [] N/A  If yes, please list doctor or Urgent Care and date of H&P:   Per Dr Brian Martinez  Additional Clearance(Cardiac, Pulmonary, etc):  [] YES    [x]  NO    Pre-Admission Testing Visit:  [] YES    [x]  NO If no, do labs/testing need to be done DOS?   [] YES    [x]  NO    Medication Reconciliation Complete:  [x] YES    []  NO        Additional Notes:                Interview Complete: [x] YES    []  NO          Adrian Tan RN  9:29 AM

## 2023-07-23 NOTE — OP NOTE
Colonoscopy Procedure Note      Patient: Berlin Juares  : 1965  Acct#:     Procedure: Colonoscopy with polypectomy (cold snare)    Date:  2023    Surgeon:  Dante Cortes MD    Referring Physician:  ISRAEL Najera    Previous Colonoscopy: YES  Date:    Greater than 3 years: YES    Preoperative Diagnosis:  1. Surveillance    Postoperative Diagnosis:  1. Transverse Colon Polyps 2. Internal hemorrhoids. Consent:  The patient or their legal guardian has signed a consent, and is aware of the potential risks, benefits, alternatives, and potential complications of this procedure. These include, but are not limited to hemorrhage, bleeding, post procedural pain, perforation, phlebitis, aspiration, hypotension, hypoxia, cardiovascular events such as arryhthmia, and possibly death. Additionally, the possibility of missed colonic polyps and interval colon cancer was discussed in the consent. Anesthesia:  The patient was administered IV propofol per anesthesiology team.  Please see their operative records for full details. Procedure: An informed consent was obtained from the patient after explanation of indications, benefits, possible risks and complications of the procedure. The patient was then taken to the endoscopy suite, placed in the left lateral decubitus position, and the above IV anesthesia was administered. A digital rectal examination was performed and revealed negative without mass, lesions or tenderness. The Olympus video colonoscope was placed in the patient's rectum under digital direction and advanced to the cecum. The cecum was identified by characteristic anatomy and ballottment. The preparation was good. The ileocecal valve was identified. The scope was then withdrawn back through the cecum, ascending, transverse, descending, sigmoid colon, and rectum. Careful circumferential examination of the mucosa in these areas demonstrated:     A 5

## 2023-07-24 ENCOUNTER — HOSPITAL ENCOUNTER (OUTPATIENT)
Age: 58
Setting detail: OUTPATIENT SURGERY
Discharge: HOME | End: 2023-07-24
Attending: INTERNAL MEDICINE | Admitting: INTERNAL MEDICINE
Payer: COMMERCIAL

## 2023-07-24 ENCOUNTER — ANESTHESIA (OUTPATIENT)
Dept: ENDOSCOPY | Age: 58
End: 2023-07-24
Payer: COMMERCIAL

## 2023-07-24 VITALS
OXYGEN SATURATION: 98 % | SYSTOLIC BLOOD PRESSURE: 134 MMHG | BODY MASS INDEX: 34.07 KG/M2 | WEIGHT: 230 LBS | HEART RATE: 68 BPM | DIASTOLIC BLOOD PRESSURE: 84 MMHG | TEMPERATURE: 97.5 F | HEIGHT: 69 IN | RESPIRATION RATE: 18 BRPM

## 2023-07-24 DIAGNOSIS — Z80.0 FAMILY HISTORY OF COLON CANCER: ICD-10-CM

## 2023-07-24 PROCEDURE — 7100000000 HC PACU RECOVERY - FIRST 15 MIN: Performed by: INTERNAL MEDICINE

## 2023-07-24 PROCEDURE — 3700000000 HC ANESTHESIA ATTENDED CARE: Performed by: INTERNAL MEDICINE

## 2023-07-24 PROCEDURE — 3700000001 HC ADD 15 MINUTES (ANESTHESIA): Performed by: INTERNAL MEDICINE

## 2023-07-24 PROCEDURE — 3609010600 HC COLONOSCOPY POLYPECTOMY SNARE/COLD BIOPSY: Performed by: INTERNAL MEDICINE

## 2023-07-24 PROCEDURE — 2580000003 HC RX 258: Performed by: NURSE ANESTHETIST, CERTIFIED REGISTERED

## 2023-07-24 PROCEDURE — 88305 TISSUE EXAM BY PATHOLOGIST: CPT

## 2023-07-24 PROCEDURE — 2709999900 HC NON-CHARGEABLE SUPPLY: Performed by: INTERNAL MEDICINE

## 2023-07-24 PROCEDURE — 2580000003 HC RX 258: Performed by: ANESTHESIOLOGY

## 2023-07-24 PROCEDURE — 6360000002 HC RX W HCPCS: Performed by: NURSE ANESTHETIST, CERTIFIED REGISTERED

## 2023-07-24 PROCEDURE — 7100000010 HC PHASE II RECOVERY - FIRST 15 MIN: Performed by: INTERNAL MEDICINE

## 2023-07-24 PROCEDURE — 7100000011 HC PHASE II RECOVERY - ADDTL 15 MIN: Performed by: INTERNAL MEDICINE

## 2023-07-24 PROCEDURE — 2500000003 HC RX 250 WO HCPCS: Performed by: NURSE ANESTHETIST, CERTIFIED REGISTERED

## 2023-07-24 RX ORDER — SODIUM CHLORIDE 0.9 % (FLUSH) 0.9 %
5-40 SYRINGE (ML) INJECTION PRN
Status: DISCONTINUED | OUTPATIENT
Start: 2023-07-24 | End: 2023-07-24 | Stop reason: HOSPADM

## 2023-07-24 RX ORDER — LIDOCAINE HYDROCHLORIDE 20 MG/ML
INJECTION, SOLUTION EPIDURAL; INFILTRATION; INTRACAUDAL; PERINEURAL PRN
Status: DISCONTINUED | OUTPATIENT
Start: 2023-07-24 | End: 2023-07-24 | Stop reason: SDUPTHER

## 2023-07-24 RX ORDER — PROPOFOL 10 MG/ML
INJECTION, EMULSION INTRAVENOUS PRN
Status: DISCONTINUED | OUTPATIENT
Start: 2023-07-24 | End: 2023-07-24 | Stop reason: SDUPTHER

## 2023-07-24 RX ORDER — SODIUM CHLORIDE 9 MG/ML
INJECTION, SOLUTION INTRAVENOUS PRN
Status: DISCONTINUED | OUTPATIENT
Start: 2023-07-24 | End: 2023-07-24 | Stop reason: HOSPADM

## 2023-07-24 RX ORDER — SODIUM CHLORIDE 0.9 % (FLUSH) 0.9 %
5-40 SYRINGE (ML) INJECTION EVERY 12 HOURS SCHEDULED
Status: DISCONTINUED | OUTPATIENT
Start: 2023-07-24 | End: 2023-07-24 | Stop reason: HOSPADM

## 2023-07-24 RX ORDER — PROPOFOL 10 MG/ML
INJECTION, EMULSION INTRAVENOUS CONTINUOUS PRN
Status: DISCONTINUED | OUTPATIENT
Start: 2023-07-24 | End: 2023-07-24 | Stop reason: SDUPTHER

## 2023-07-24 RX ORDER — SODIUM CHLORIDE 9 MG/ML
INJECTION, SOLUTION INTRAVENOUS CONTINUOUS PRN
Status: DISCONTINUED | OUTPATIENT
Start: 2023-07-24 | End: 2023-07-24 | Stop reason: SDUPTHER

## 2023-07-24 RX ADMIN — SODIUM CHLORIDE: 9 INJECTION, SOLUTION INTRAVENOUS at 10:24

## 2023-07-24 RX ADMIN — PROPOFOL 20 MG: 10 INJECTION, EMULSION INTRAVENOUS at 10:32

## 2023-07-24 RX ADMIN — LIDOCAINE HYDROCHLORIDE 80 MG: 20 INJECTION, SOLUTION EPIDURAL; INFILTRATION; INTRACAUDAL; PERINEURAL at 10:27

## 2023-07-24 RX ADMIN — PROPOFOL 180 MCG/KG/MIN: 10 INJECTION, EMULSION INTRAVENOUS at 10:27

## 2023-07-24 RX ADMIN — SODIUM CHLORIDE: 9 INJECTION, SOLUTION INTRAVENOUS at 09:27

## 2023-07-24 RX ADMIN — PROPOFOL 80 MG: 10 INJECTION, EMULSION INTRAVENOUS at 10:27

## 2023-07-24 ASSESSMENT — PAIN - FUNCTIONAL ASSESSMENT: PAIN_FUNCTIONAL_ASSESSMENT: NONE - DENIES PAIN

## 2023-07-24 NOTE — DISCHARGE INSTRUCTIONS
Recommendations:  Await pathology results   Patient recently had some abdominal cramping and rectal bleeding. Concern for an infectious colitis. No evidence of any persistent colitis on exam today and patient is feeling better. Recommend repeat colonoscopy in 5 years for surveillance purposes  Results will be posted to the [facility] patient portal in 7-10 days. If you dont have access call the office phone at [phone] for results. Discharge Instructions for Colonoscopy     Colonoscopy is a visual exam of the lining of the large intestine, also called the bowel or colon, with a colonoscope. A colonoscope is a flexible tube with a light and a viewing device. It allows the doctor to view the inside of the colon through a tiny video camera. Colonoscopy is performed for many reasons: unexplained anemia , pain, diarrhea , bloody stools, cancer screening, among many other reasons. Complications from a colonoscopy are rare. Some possible serious complications include perforated bowel (which might require surgery) and bleeding (which could require blood transfusion ). Minor complications include bloating, gas, and cramping that can last for 1-2 days after the procedure. Because air is put into your colon during the procedure, it is normal to pass large amounts of air from your rectum. You may not have a bowel movement for 1-3 days after the procedure. What You Will Need:  Someone to drive you home after the procedure     Steps to Take:  [address] when you get home. Because the sedative will make you drowsy, don't drive, operate machinery, or make important decisions the day of the procedure. Feelings of bloating, gas, or cramping may persist for 24 hours. Diet -  Try sips of water first. If tolerated, resume bland food (scrambled eggs, toast, soup) first.  If tolerated, resume regular diet or the diet recommended by your physician. Do not drink alcohol for 24 hours.    Physical

## 2023-07-24 NOTE — ANESTHESIA POSTPROCEDURE EVALUATION
Department of Anesthesiology  Postprocedure Note    Patient: Bettina Figueroa  MRN: [id number]  YOB: 1965  Date of evaluation: 7/24/2023      Procedure Summary     Date: 07/24/23 Room / Location: [address] [place] [address]    Anesthesia Start: 1024 Anesthesia Stop: 7270    Procedure: COLONOSCOPY POLYPECTOMY SNARE/COLD BIOPSY Diagnosis:       Family history of colon cancer      (Family history of colon cancer [Z80.0])    Surgeons: Lynn Serrano MD Responsible Provider: Marco Antonio Bruce MD    Anesthesia Type: MAC ASA Status: 2          Anesthesia Type: No value filed.     Milagros Phase I: Milagros Score: 9    Milagros Phase II:        Anesthesia Post Evaluation    Patient location during evaluation: bedside  Patient participation: complete - patient participated  Level of consciousness: awake and alert  Pain score: 0  Airway patency: patent  Nausea & Vomiting: no vomiting  Complications: no  Cardiovascular status: blood pressure returned to baseline  Respiratory status: acceptable  Hydration status: euvolemic

## 2023-09-18 ENCOUNTER — COMMUNITY OUTREACH (OUTPATIENT)
Dept: INTERNAL MEDICINE CLINIC | Age: 58
End: 2023-09-18

## 2023-09-19 ENCOUNTER — COMMUNITY OUTREACH (OUTPATIENT)
Dept: INTERNAL MEDICINE CLINIC | Age: 58
End: 2023-09-19

## 2023-09-19 NOTE — PROGRESS NOTES
Patient's HM shows they are overdue for [address] and  files searched  without success.

## 2023-10-06 ENCOUNTER — TELEPHONE (OUTPATIENT)
Dept: INTERNAL MEDICINE CLINIC | Age: 58
End: 2023-10-06

## 2023-10-11 DIAGNOSIS — I10 ESSENTIAL HYPERTENSION: ICD-10-CM

## 2023-10-11 RX ORDER — AMLODIPINE BESYLATE 10 MG/1
TABLET ORAL
Qty: 90 TABLET | Refills: 1 | Status: SHIPPED | OUTPATIENT
Start: 2023-10-11

## 2023-10-11 RX ORDER — TRIAMTERENE AND HYDROCHLOROTHIAZIDE 37.5; 25 MG/1; MG/1
TABLET ORAL
Qty: 90 TABLET | Refills: 1 | Status: SHIPPED | OUTPATIENT
Start: 2023-10-11

## 2023-10-11 NOTE — TELEPHONE ENCOUNTER
Medication:   Requested Prescriptions     Pending Prescriptions Disp Refills    amLODIPine (NORVASC) 10 MG tablet [Pharmacy Med Name: AMLODIPINE BESYLATE 10 MG TAB] 90 tablet 1     Sig: TAKE 1 TABLET BY MOUTH EVERY DAY    triamterene-hydroCHLOROthiazide (MAXZIDE-25) 37.5-25 MG per tablet [Pharmacy Med Name: Dolores Kayser 37.5-25 MG TB] 90 tablet 1     Sig: TAKE 1 TABLET BY MOUTH EVERY DAY     Last Filled:  2/20/23    Last appt: 9/26/2022   Next appt: 10/16/2023    Last OARRS:        No data to display

## 2023-10-13 SDOH — ECONOMIC STABILITY: FOOD INSECURITY: WITHIN THE PAST 12 MONTHS, YOU WORRIED THAT YOUR FOOD WOULD RUN OUT BEFORE YOU GOT MONEY TO BUY MORE.: NEVER TRUE

## 2023-10-13 SDOH — ECONOMIC STABILITY: INCOME INSECURITY: HOW HARD IS IT FOR YOU TO PAY FOR THE VERY BASICS LIKE FOOD, HOUSING, MEDICAL CARE, AND HEATING?: NOT HARD AT ALL

## 2023-10-13 SDOH — ECONOMIC STABILITY: HOUSING INSECURITY
IN THE LAST 12 MONTHS, WAS THERE A TIME WHEN YOU DID NOT HAVE A STEADY PLACE TO SLEEP OR SLEPT IN A SHELTER (INCLUDING NOW)?: NO

## 2023-10-13 SDOH — ECONOMIC STABILITY: FOOD INSECURITY: WITHIN THE PAST 12 MONTHS, THE FOOD YOU BOUGHT JUST DIDN'T LAST AND YOU DIDN'T HAVE MONEY TO GET MORE.: NEVER TRUE

## 2023-10-13 SDOH — ECONOMIC STABILITY: TRANSPORTATION INSECURITY
IN THE PAST 12 MONTHS, HAS LACK OF TRANSPORTATION KEPT YOU FROM MEETINGS, WORK, OR FROM GETTING THINGS NEEDED FOR DAILY LIVING?: NO

## 2023-10-13 ASSESSMENT — PATIENT HEALTH QUESTIONNAIRE - PHQ9
2. FEELING DOWN, DEPRESSED OR HOPELESS: NOT AT ALL
2. FEELING DOWN, DEPRESSED OR HOPELESS: 0
SUM OF ALL RESPONSES TO PHQ9 QUESTIONS 1 & 2: 0
SUM OF ALL RESPONSES TO PHQ QUESTIONS 1-9: 0
SUM OF ALL RESPONSES TO PHQ QUESTIONS 1-9: 0
SUM OF ALL RESPONSES TO PHQ9 QUESTIONS 1 & 2: 0
1. LITTLE INTEREST OR PLEASURE IN DOING THINGS: 0
SUM OF ALL RESPONSES TO PHQ QUESTIONS 1-9: 0
SUM OF ALL RESPONSES TO PHQ QUESTIONS 1-9: 0
1. LITTLE INTEREST OR PLEASURE IN DOING THINGS: NOT AT ALL

## 2023-10-15 NOTE — PROGRESS NOTES
Date: 10/16/2023                                               Subjective/Objective:     Chief Complaint   Patient presents with    Hypertension       HPI    Michael Bennett is a 60 yo female, visit today for follow up on hypertension. She denies concerns today. Hypertension - on amlodipine and triamterene/hydrochlorothiazide. She does not monitor blood pressure at home. She denies chest pain, shortness of breath and headache. Asthma/Allergic rhinitis managed on Flonase and Singulair. She denies medication side effects. H/o melanoma. S/p MOHS left foot 2021. Colon cancer screenin2023 Dr Gertrudis Mix, 5 year follow up  Gyn:               Last pap smear: needs               Last Mammogram: needs-scheduled this week              GYN care managed by: has referral  Social:  never smoker. Daily alcohol use.    Exercise: no                  Patient Active Problem List    Diagnosis Date Noted    History of melanoma 2021    Dysplastic nevi 2021    Melanoma in situ ([address]) 2021    History of adenomatous polyp of colon 2019    Essential hypertension 2017    Asthma 2017    Chronic cough 2017    FH: colon cancer 2017    FH: melanoma 2017    Obesity (BMI 30.0-34.9) 2017    Grief 2017    Pure hypercholesterolemia 2017       Past Medical History:   Diagnosis Date    Asthma     Benign cyst of breast     Chronic cough     History of melanoma 2021    Hyperlipidemia     Hypertension        Past Surgical History:   Procedure Laterality Date    COLONOSCOPY  2023    Dr. Autumn Pandya    COLONOSCOPY N/A 2019    COLONOSCOPY POLYPECTOMY SNARE/COLD BIOPSY performed by Matt Chu MD at [address] [facility] N/A 2023    COLONOSCOPY POLYPECTOMY SNARE/COLD BIOPSY performed by Matt Chu MD at [facility]      OTHER SURGICAL HISTORY      melanoma left foot       Community

## 2023-10-16 ENCOUNTER — OFFICE VISIT (OUTPATIENT)
Dept: INTERNAL MEDICINE CLINIC | Age: 58
End: 2023-10-16
Payer: COMMERCIAL

## 2023-10-16 VITALS
BODY MASS INDEX: 34.37 KG/M2 | HEART RATE: 83 BPM | TEMPERATURE: 98.2 F | SYSTOLIC BLOOD PRESSURE: 118 MMHG | DIASTOLIC BLOOD PRESSURE: 80 MMHG | WEIGHT: 229.38 LBS | OXYGEN SATURATION: 98 %

## 2023-10-16 DIAGNOSIS — I10 ESSENTIAL HYPERTENSION: Primary | ICD-10-CM

## 2023-10-16 DIAGNOSIS — E78.00 PURE HYPERCHOLESTEROLEMIA: ICD-10-CM

## 2023-10-16 DIAGNOSIS — D03.72 MELANOMA IN SITU OF LEFT LOWER EXTREMITY (HCC): ICD-10-CM

## 2023-10-16 DIAGNOSIS — Z00.00 PREVENTATIVE HEALTH CARE: ICD-10-CM

## 2023-10-16 LAB
ANION GAP SERPL CALCULATED.3IONS-SCNC: 12 MMOL/L (ref 3–16)
BUN SERPL-MCNC: 13 MG/DL (ref 7–20)
CALCIUM SERPL-MCNC: 9.4 MG/DL (ref 8.3–10.6)
CHLORIDE SERPL-SCNC: 99 MMOL/L (ref 99–110)
CHOLEST SERPL-MCNC: 267 MG/DL (ref 0–199)
CO2 SERPL-SCNC: 30 MMOL/L (ref 21–32)
CREAT SERPL-MCNC: 0.8 MG/DL (ref 0.6–1.2)
GFR SERPLBLD CREATININE-BSD FMLA CKD-EPI: >60 ML/MIN/{1.73_M2}
GLUCOSE SERPL-MCNC: 89 MG/DL (ref 70–99)
HDLC SERPL-MCNC: 100 MG/DL (ref 40–60)
LDL CHOLESTEROL CALCULATED: 149 MG/DL
POTASSIUM SERPL-SCNC: 3.9 MMOL/L (ref 3.5–5.1)
SODIUM SERPL-SCNC: 141 MMOL/L (ref 136–145)
TRIGL SERPL-MCNC: 88 MG/DL (ref 0–150)
VLDLC SERPL CALC-MCNC: 18 MG/DL

## 2023-10-16 PROCEDURE — 99214 OFFICE O/P EST MOD 30 MIN: CPT | Performed by: NURSE PRACTITIONER

## 2023-10-16 PROCEDURE — 3079F DIAST BP 80-89 MM HG: CPT | Performed by: NURSE PRACTITIONER

## 2023-10-16 PROCEDURE — 3074F SYST BP LT 130 MM HG: CPT | Performed by: NURSE PRACTITIONER

## 2023-10-16 PROCEDURE — 36415 COLL VENOUS BLD VENIPUNCTURE: CPT | Performed by: NURSE PRACTITIONER

## 2023-10-16 ASSESSMENT — ENCOUNTER SYMPTOMS
SHORTNESS OF BREATH: 0
CONSTIPATION: 0

## 2023-10-19 ENCOUNTER — HOSPITAL ENCOUNTER (OUTPATIENT)
Dept: WOMENS IMAGING | Age: 58
Discharge: HOME | End: 2023-10-19
Payer: COMMERCIAL

## 2023-10-19 DIAGNOSIS — Z12.31 ENCOUNTER FOR SCREENING MAMMOGRAM FOR MALIGNANT NEOPLASM OF BREAST: ICD-10-CM

## 2023-10-19 PROCEDURE — 77067 SCR MAMMO BI INCL CAD: CPT

## 2023-10-20 DIAGNOSIS — R92.8 ABNORMAL MAMMOGRAM OF BOTH BREASTS: Primary | ICD-10-CM

## 2023-11-21 ENCOUNTER — HOSPITAL ENCOUNTER (OUTPATIENT)
Dept: WOMENS IMAGING | Age: 58
Discharge: HOME | End: 2023-11-21
Payer: COMMERCIAL

## 2023-11-21 ENCOUNTER — HOSPITAL ENCOUNTER (OUTPATIENT)
Dept: ULTRASOUND IMAGING | Age: 58
Discharge: HOME | End: 2023-11-21
Payer: COMMERCIAL

## 2023-11-21 DIAGNOSIS — R92.8 ABNORMAL MAMMOGRAM OF BOTH BREASTS: ICD-10-CM

## 2023-11-21 PROCEDURE — G0279 TOMOSYNTHESIS, MAMMO: HCPCS

## 2023-11-21 PROCEDURE — 76642 ULTRASOUND BREAST LIMITED: CPT

## 2023-12-04 DIAGNOSIS — R92.8 ABNORMAL MAMMOGRAM OF RIGHT BREAST: ICD-10-CM

## 2023-12-04 DIAGNOSIS — N63.10 MASS OF RIGHT BREAST, UNSPECIFIED QUADRANT: Primary | ICD-10-CM

## 2023-12-13 ENCOUNTER — HOSPITAL ENCOUNTER (OUTPATIENT)
Dept: WOMENS IMAGING | Age: 58
Discharge: HOME | End: 2023-12-13
Payer: COMMERCIAL

## 2023-12-13 ENCOUNTER — HOSPITAL ENCOUNTER (OUTPATIENT)
Dept: ULTRASOUND IMAGING | Age: 58
Discharge: HOME | End: 2023-12-13
Payer: COMMERCIAL

## 2023-12-13 DIAGNOSIS — N63.10 MASS OF RIGHT BREAST, UNSPECIFIED QUADRANT: ICD-10-CM

## 2023-12-13 DIAGNOSIS — R92.8 ABNORMAL MAMMOGRAM OF RIGHT BREAST: ICD-10-CM

## 2023-12-13 DIAGNOSIS — R92.8 ABNORMAL MAMMOGRAM: ICD-10-CM

## 2023-12-13 PROCEDURE — 19083 BX BREAST 1ST LESION US IMAG: CPT

## 2023-12-13 PROCEDURE — 88305 TISSUE EXAM BY PATHOLOGIST: CPT

## 2023-12-13 PROCEDURE — 19084 BX BREAST ADD LESION US IMAG: CPT

## 2023-12-13 PROCEDURE — G0279 TOMOSYNTHESIS, MAMMO: HCPCS

## 2023-12-13 NOTE — DISCHARGE INSTRUCTIONS
[facility] and Discharge [address] [place] [address] [address]  Telephone: [phone]    NAME:  Lurdes Alejo OF BIRTH:  1965  GENDER: female  MEDICAL RECORD NUMBER:  [id number]  TODAY'S DATE:  @ED@    Discharge Instructions [facility]:    Post Breast Biopsy Instructions    [x] Place the provided cold pack inside your bra on top of the dressing for at least 3 hours. You may re-freeze it and use it again later if you have any discomfort. [x] You may take Tylenol (Acetaminophen) the day of your biopsy for any discomfort. [x] Watch for excessive bleeding. If bleeding occurs apply pressure to site. Watch for signs of infection, increased pain, redness, swelling and heat. If this occurs call your physician. [x] Do not participate in any strenuous exercise for 48 hours after your biopsy, such as tennis, aerobics, weight lifting and household activities that involve use of your affected   arm. [x] You may remove your outer dressing in 24 hours and you may shower. \"Steri-strips\" tape will stay on for a few days longer then can be removed. [facility] Information:   Should you experience any significant changes in your health or have questions about your care, please contact:  [address] [place] [address],[address]   Monday-Friday 8:00 am - 4:30 pm.  If you need help with your care outside these hours and cannot wait until we are again available,  contact your Physician or go to the hospital emergency.        Electronically signed by Jeff Pedraza RN on 12/13/2023 at 12:28 PM

## 2023-12-13 NOTE — PROGRESS NOTES
Breast Biopsy Nursing Note    NAME:  Yuki Mcintyre  YOB: 1965 GENDER: female  MEDICAL RECORD NUMBER:  [id number]  DATE:  12/13/2023    Two right breast biopsies were performed today. A= Retroareolar   B= 12:00 position  Breast Biopsy completed by . Expiration date site marker checked immediately prior to use. Package intact prior to use and no damage noted. Site marker was removed from protective sterile packaging by physician. Site marker was placed by physician into right     breast/s. Hemostasis achieved via site pressure; Biopsy site cleansed with alcohol  Steri-strips applied along with small amount of bacitracin-neomycin polymixin then Coverderm   Breast Biopsy tissue was placed in proper container/s and labeled. Breast Biopsy tissue was taken to Pathology for evaluation. Procedural Pain:  0  / 10     Post Procedural Pain:  0 / 10     Procedure well tolerated. Pt stable and alert and oriented x 3 upon discharge. Ice pack placed over biopsy site. Pt given post-biopsy education and all questions answered. Pt has NN contact info.        Electronically signed by Chavo Brown RN on 12/13/2023 at 5:18 PM

## 2023-12-14 DIAGNOSIS — N64.89 PSEUDOANGIOMATOUS STROMAL HYPERPLASIA OF BREAST: Primary | ICD-10-CM

## 2024-04-08 DIAGNOSIS — I10 ESSENTIAL HYPERTENSION: ICD-10-CM

## 2024-04-08 RX ORDER — AMLODIPINE BESYLATE 10 MG/1
TABLET ORAL
Qty: 90 TABLET | Refills: 0 | Status: SHIPPED | OUTPATIENT
Start: 2024-04-08

## 2024-04-08 RX ORDER — TRIAMTERENE AND HYDROCHLOROTHIAZIDE 37.5; 25 MG/1; MG/1
TABLET ORAL
Qty: 90 TABLET | Refills: 0 | Status: SHIPPED | OUTPATIENT
Start: 2024-04-08

## 2024-04-12 ENCOUNTER — TELEPHONE (OUTPATIENT)
Dept: SURGERY | Age: 59
End: 2024-04-12

## 2024-04-12 NOTE — TELEPHONE ENCOUNTER
Spoke with patient to collect new patient intake form. Patient is scheduled to see Shalonda DENT on 4/16/24.

## 2024-04-16 ENCOUNTER — OFFICE VISIT (OUTPATIENT)
Dept: SURGERY | Age: 59
End: 2024-04-16
Payer: COMMERCIAL

## 2024-04-16 VITALS
OXYGEN SATURATION: 98 % | HEIGHT: 69 IN | WEIGHT: 242 LBS | BODY MASS INDEX: 35.84 KG/M2 | HEART RATE: 82 BPM | RESPIRATION RATE: 18 BRPM

## 2024-04-16 DIAGNOSIS — Z98.890 HISTORY OF RIGHT BREAST BIOPSY: ICD-10-CM

## 2024-04-16 DIAGNOSIS — N64.89 PSEUDOANGIOMATOUS STROMAL HYPERPLASIA OF BREAST: ICD-10-CM

## 2024-04-16 DIAGNOSIS — R92.8 ABNORMAL FINDING ON BREAST IMAGING: Primary | ICD-10-CM

## 2024-04-16 DIAGNOSIS — N63.20 MASS OF BOTH BREASTS ON MAMMOGRAM: ICD-10-CM

## 2024-04-16 DIAGNOSIS — N63.10 MASS OF BOTH BREASTS ON MAMMOGRAM: ICD-10-CM

## 2024-04-16 PROCEDURE — 99204 OFFICE O/P NEW MOD 45 MIN: CPT | Performed by: NURSE PRACTITIONER

## 2024-04-16 NOTE — PROGRESS NOTES
[facility]  Surgical Breast Oncology     Primary Care Provider: Iesha Wagoner APRN   Medical Oncologist:  Radiation Oncologist:     CC: Abnormal breast imaging and breast biopsy results     Mell Hopson is being seen at the request of Iesha Wagoner APRN for a consultation for abnormal breast imaging and breast biopsy results.       HPI: Ms. Mell Hopson is a 58 y.o. woman who presents today for abnormal breast imaging with bilateral breast masses and biopsy x2 site on 12/13/2023.  She states that she does perform routine self breast evaluations and has not noticed any new abnormalities such as masses, skin changes, color changes,nipple discharge, or changes to the nipple-areolar complex.     No family history of breast or ovarian cancer.      INTERVAL HISTORY:  Bilateral screening mammogram 10/19/2023:  Multiple well-circumscribed masses throughout bilateral breast including 8 mm mass middle zone right upper central breast and 1.8 cm mass left retroareolar breast.  BI-RADS 0.    Bilateral diagnostic mammogram and bilateral breast ultrasound 11/21/2023:  Numerous circumscribed partially circumscribed nodular asymmetries within each breast persist with cone compression.  No dominant suspicious masses or focal pleomorphic microcalcifications evident to reflect malignancy.  Sonographically there is a 10 mm hypoechoic mass retroareolar right breast that exhibits posterior acoustic shadowing and biopsy is recommended.  There are other multiple small anechoic and hypoechoic nodules throughout both breast.  BI-RADS 4.    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  Negative for

## 2024-04-17 DIAGNOSIS — N64.89 PSEUDOANGIOMATOUS STROMAL HYPERPLASIA OF BREAST: ICD-10-CM

## 2024-04-17 DIAGNOSIS — R92.8 ABNORMAL FINDING ON BREAST IMAGING: Primary | ICD-10-CM

## 2024-04-22 ENCOUNTER — TELEPHONE (OUTPATIENT)
Dept: DERMATOLOGY | Age: 59
End: 2024-04-22

## 2024-04-22 NOTE — TELEPHONE ENCOUNTER
Derm called to request ASAP records for patient's visits in our office. Mell is currently in office at . Visits and lab report from Dr. Sanabria and Dr. Kimberlyn Ayala successfully faxed to [phone].

## 2024-05-10 ENCOUNTER — TELEPHONE (OUTPATIENT)
Dept: SURGERY | Age: 59
End: 2024-05-10

## 2024-05-10 NOTE — TELEPHONE ENCOUNTER
Called and left message to r/s patient's ov @3:30 in FF with Shalonda. When patient returns call pleas r/s patient to next available appointment with Shalonda.

## 2024-06-18 ENCOUNTER — HOSPITAL ENCOUNTER (OUTPATIENT)
Dept: WOMENS IMAGING | Age: 59
Discharge: HOME | End: 2024-06-18
Payer: COMMERCIAL

## 2024-06-18 ENCOUNTER — HOSPITAL ENCOUNTER (OUTPATIENT)
Dept: ULTRASOUND IMAGING | Age: 59
Discharge: HOME | End: 2024-06-18
Payer: COMMERCIAL

## 2024-06-18 VITALS — WEIGHT: 230 LBS | HEIGHT: 68 IN | BODY MASS INDEX: 34.86 KG/M2

## 2024-06-18 DIAGNOSIS — N64.89 PSEUDOANGIOMATOUS STROMAL HYPERPLASIA OF BREAST: ICD-10-CM

## 2024-06-18 DIAGNOSIS — R92.8 ABNORMAL FINDING ON BREAST IMAGING: ICD-10-CM

## 2024-06-18 PROCEDURE — 76642 ULTRASOUND BREAST LIMITED: CPT

## 2024-06-18 PROCEDURE — G0279 TOMOSYNTHESIS, MAMMO: HCPCS

## 2024-06-27 ENCOUNTER — OFFICE VISIT (OUTPATIENT)
Dept: SURGERY | Age: 59
End: 2024-06-27
Payer: COMMERCIAL

## 2024-06-27 VITALS
HEIGHT: 68 IN | DIASTOLIC BLOOD PRESSURE: 78 MMHG | OXYGEN SATURATION: 98 % | WEIGHT: 244 LBS | HEART RATE: 96 BPM | BODY MASS INDEX: 36.98 KG/M2 | SYSTOLIC BLOOD PRESSURE: 138 MMHG

## 2024-06-27 DIAGNOSIS — Z98.890 HISTORY OF RIGHT BREAST BIOPSY: ICD-10-CM

## 2024-06-27 DIAGNOSIS — R92.8 ABNORMAL FINDING ON BREAST IMAGING: Primary | ICD-10-CM

## 2024-06-27 DIAGNOSIS — N64.89 PSEUDOANGIOMATOUS STROMAL HYPERPLASIA OF BREAST: ICD-10-CM

## 2024-06-27 DIAGNOSIS — N63.10 MASS OF BOTH BREASTS ON MAMMOGRAM: ICD-10-CM

## 2024-06-27 DIAGNOSIS — N63.20 MASS OF BOTH BREASTS ON MAMMOGRAM: ICD-10-CM

## 2024-06-27 PROCEDURE — 3078F DIAST BP <80 MM HG: CPT | Performed by: NURSE PRACTITIONER

## 2024-06-27 PROCEDURE — 99213 OFFICE O/P EST LOW 20 MIN: CPT | Performed by: NURSE PRACTITIONER

## 2024-06-27 PROCEDURE — 3075F SYST BP GE 130 - 139MM HG: CPT | Performed by: NURSE PRACTITIONER

## 2024-06-27 NOTE — PROGRESS NOTES
[facility]  Surgical Breast Oncology     Primary Care Provider: Iesha Wagoner APRN     CC: Abnormal breast imaging and breast biopsy follow up     HPI: Ms. Mell Hopson is a 58 y.o. woman who presents today for follow up of abnormal breast imaging with bilateral breast masses and biopsy x2 site on 12/13/2023.  She states that she does perform routine self breast evaluations and has not noticed any new abnormalities such as masses, skin changes, color changes,nipple discharge, or changes to the nipple-areolar complex.     No family history of breast or ovarian cancer.      INTERVAL HISTORY:  Bilateral screening mammogram 10/19/2023:  Multiple well-circumscribed masses throughout bilateral breast including 8 mm mass middle zone right upper central breast and 1.8 cm mass left retroareolar breast.  BI-RADS 0.    Bilateral diagnostic mammogram and bilateral breast ultrasound 11/21/2023:  Numerous circumscribed partially circumscribed nodular asymmetries within each breast persist with cone compression.  No dominant suspicious masses or focal pleomorphic microcalcifications evident to reflect malignancy.  Sonographically there is a 10 mm hypoechoic mass retroareolar right breast that exhibits posterior acoustic shadowing and biopsy is recommended.  There are other multiple small anechoic and hypoechoic nodules throughout both breast.  BI-RADS 4.    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  Negative for atypia or malignancy.\"  Radiology pathology are concordant at both sites.   BI-RADS 3.    Bilateral diagnostic mammogram and bilateral breast ultrasound 6/18/2024:  Breast density: The

## 2024-07-15 DIAGNOSIS — I10 ESSENTIAL HYPERTENSION: ICD-10-CM

## 2024-07-15 RX ORDER — AMLODIPINE BESYLATE 10 MG/1
TABLET ORAL
Qty: 90 TABLET | Refills: 0 | OUTPATIENT
Start: 2024-07-15

## 2024-07-15 RX ORDER — TRIAMTERENE/HYDROCHLOROTHIAZID 37.5-25 MG
TABLET ORAL
Qty: 90 TABLET | Refills: 0 | OUTPATIENT
Start: 2024-07-15

## 2024-08-15 ASSESSMENT — PATIENT HEALTH QUESTIONNAIRE - PHQ9
SUM OF ALL RESPONSES TO PHQ9 QUESTIONS 1 & 2: 0
1. LITTLE INTEREST OR PLEASURE IN DOING THINGS: NOT AT ALL
SUM OF ALL RESPONSES TO PHQ QUESTIONS 1-9: 0
SUM OF ALL RESPONSES TO PHQ9 QUESTIONS 1 & 2: 0
SUM OF ALL RESPONSES TO PHQ QUESTIONS 1-9: 0
SUM OF ALL RESPONSES TO PHQ QUESTIONS 1-9: 0
1. LITTLE INTEREST OR PLEASURE IN DOING THINGS: NOT AT ALL
2. FEELING DOWN, DEPRESSED OR HOPELESS: NOT AT ALL
2. FEELING DOWN, DEPRESSED OR HOPELESS: NOT AT ALL
SUM OF ALL RESPONSES TO PHQ QUESTIONS 1-9: 0

## 2024-08-15 NOTE — PROGRESS NOTES
POLYPECTOMY SNARE/COLD BIOPSY performed by Jun Tubbs MD at [facility] ENDOSCOPY    COLONOSCOPY N/A 7/24/2023    COLONOSCOPY POLYPECTOMY SNARE/COLD BIOPSY performed by Jun Tubbs MD at [facility] ENDOSCOPY    CYST REMOVAL  2004    OTHER SURGICAL HISTORY      melanoma left foot    US BREAST BIOPSY NEEDLE ADDITIONAL RIGHT Right 12/13/2023    US BREAST BIOPSY NEEDLE ADDITIONAL RIGHT 12/13/2023 [facility] ULTRASOUND    US BREAST BIOPSY W LOC DEVICE 1ST LESION RIGHT Right 12/13/2023    US BREAST BIOPSY W LOC DEVICE 1ST LESION RIGHT 12/13/2023 [facility] ULTRASOUND       Office Visit on 10/16/2023   Component Date Value Ref Range Status    Cholesterol, Fasting 10/16/2023 267 (H)  0 - 199 mg/dL Final    Triglyceride, Fasting 10/16/2023 88  0 - 150 mg/dL Final    HDL 10/16/2023 100 (H)  40 - 60 mg/dL Final    LDL Calculated 10/16/2023 149 (H)  <100 mg/dL Final    VLDL Cholesterol Calculated 10/16/2023 18  Not Established mg/dL Final    Sodium 10/16/2023 141  136 - 145 mmol/L Final    Potassium 10/16/2023 3.9  3.5 - 5.1 mmol/L Final    Chloride 10/16/2023 99  99 - 110 mmol/L Final    CO2 10/16/2023 30  21 - 32 mmol/L Final    Anion Gap 10/16/2023 12  3 - 16 Final    Glucose 10/16/2023 89  70 - 99 mg/dL Final    BUN 10/16/2023 13  7 - 20 mg/dL Final    Creatinine 10/16/2023 0.8  0.6 - 1.2 mg/dL Final    Est, Glom Filt Rate 10/16/2023 >60  >60 Final    Comment: Pediatric calculator link  https://www.kidney.org/professionals/kdoqi/gfr_calculatorped  Effective Oct 3, 2022  These results are not intended for use in patients  <18 years of age.  eGFR results are calculated without  a race factor using the 2021 CKD-EPI equation.  Careful  clinical correlation is recommended, particularly when  comparing to results calculated using previous equations.  The CKD-EPI equation is less accurate in patients with  extremes of muscle mass, extra-renal metabolism of  creatinine, excessive creatinine ingestion, or following  therapy that

## 2024-08-16 ENCOUNTER — OFFICE VISIT (OUTPATIENT)
Dept: INTERNAL MEDICINE CLINIC | Age: 59
End: 2024-08-16

## 2024-08-16 VITALS
SYSTOLIC BLOOD PRESSURE: 134 MMHG | DIASTOLIC BLOOD PRESSURE: 80 MMHG | OXYGEN SATURATION: 96 % | HEIGHT: 68 IN | WEIGHT: 243 LBS | BODY MASS INDEX: 36.83 KG/M2 | HEART RATE: 90 BPM

## 2024-08-16 DIAGNOSIS — E78.00 PURE HYPERCHOLESTEROLEMIA: ICD-10-CM

## 2024-08-16 DIAGNOSIS — Z85.820 HISTORY OF MELANOMA: ICD-10-CM

## 2024-08-16 DIAGNOSIS — I10 ESSENTIAL HYPERTENSION: Primary | ICD-10-CM

## 2024-08-16 DIAGNOSIS — E66.01 SEVERE OBESITY (BMI 35.0-39.9) WITH COMORBIDITY (HCC): ICD-10-CM

## 2024-08-16 DIAGNOSIS — Z00.00 PREVENTATIVE HEALTH CARE: ICD-10-CM

## 2024-08-16 RX ORDER — TRIAMTERENE AND HYDROCHLOROTHIAZIDE 37.5; 25 MG/1; MG/1
1 TABLET ORAL DAILY
Qty: 90 TABLET | Refills: 1 | Status: SHIPPED | OUTPATIENT
Start: 2024-08-16

## 2024-08-16 RX ORDER — AMLODIPINE BESYLATE 10 MG/1
10 TABLET ORAL DAILY
Qty: 90 TABLET | Refills: 1 | Status: SHIPPED | OUTPATIENT
Start: 2024-08-16

## 2024-08-16 ASSESSMENT — ENCOUNTER SYMPTOMS
CONSTIPATION: 0
SHORTNESS OF BREATH: 0

## 2024-08-17 LAB
ANION GAP SERPL CALCULATED.3IONS-SCNC: 20 MMOL/L (ref 3–16)
BUN SERPL-MCNC: 13 MG/DL (ref 7–20)
CALCIUM SERPL-MCNC: 9.4 MG/DL (ref 8.3–10.6)
CHLORIDE SERPL-SCNC: 96 MMOL/L (ref 99–110)
CO2 SERPL-SCNC: 25 MMOL/L (ref 21–32)
CREAT SERPL-MCNC: 0.9 MG/DL (ref 0.6–1.2)
GFR SERPLBLD CREATININE-BSD FMLA CKD-EPI: 73 ML/MIN/{1.73_M2}
GLUCOSE SERPL-MCNC: 80 MG/DL (ref 70–99)
POTASSIUM SERPL-SCNC: 3.7 MMOL/L (ref 3.5–5.1)
SODIUM SERPL-SCNC: 141 MMOL/L (ref 136–145)

## 2025-02-07 DIAGNOSIS — I10 ESSENTIAL HYPERTENSION: ICD-10-CM

## 2025-02-07 RX ORDER — TRIAMTERENE AND HYDROCHLOROTHIAZIDE 37.5; 25 MG/1; MG/1
1 TABLET ORAL DAILY
Qty: 90 TABLET | Refills: 0 | Status: SHIPPED | OUTPATIENT
Start: 2025-02-07

## 2025-02-07 RX ORDER — AMLODIPINE BESYLATE 10 MG/1
10 TABLET ORAL DAILY
Qty: 90 TABLET | Refills: 0 | Status: SHIPPED | OUTPATIENT
Start: 2025-02-07

## 2025-02-07 NOTE — TELEPHONE ENCOUNTER
Future Appointments   Date Time Provider Department Center   7/11/2025  9:40 AM [place] MAMMOGRAPHY RM 1 [facility] M[facility]MO [facility]   7/11/2025 10:00 AM [place] US RM 1 TZ [facility]   7/11/2025 11:00 AM Shalonda Fontaine, [place] - CNP [place] [facility]       Last appt 8/16/24

## 2025-02-18 ENCOUNTER — TELEPHONE (OUTPATIENT)
Dept: BREAST CENTER | Age: 60
End: 2025-02-18

## 2025-02-18 NOTE — TELEPHONE ENCOUNTER
Called patient left message on voice mail to cancel appointment on 07/11/24  Mammogram or US has not been canceled. (For [place] location)    Shalonda is no longer at [facility].    Dr. Mckenna can be reached at [phone] with [facility] to make appt. Will be at the [facility]

## 2025-05-11 DIAGNOSIS — I10 ESSENTIAL HYPERTENSION: ICD-10-CM

## 2025-05-12 RX ORDER — AMLODIPINE BESYLATE 10 MG/1
10 TABLET ORAL DAILY
Qty: 90 TABLET | Refills: 0 | Status: SHIPPED | OUTPATIENT
Start: 2025-05-12

## 2025-05-12 RX ORDER — TRIAMTERENE AND HYDROCHLOROTHIAZIDE 37.5; 25 MG/1; MG/1
1 TABLET ORAL DAILY
Qty: 90 TABLET | Refills: 0 | Status: SHIPPED | OUTPATIENT
Start: 2025-05-12

## 2025-05-12 NOTE — TELEPHONE ENCOUNTER
Future Appointments   Date Time Provider Department Center   7/29/2025 11:30 AM [facility] MAMMO RM 2 [facility]   7/29/2025  2:30 PM Shalonda Fontaine, [place] - CNP [facility] BRST SURG [facility]

## 2025-07-29 ENCOUNTER — HOSPITAL ENCOUNTER (OUTPATIENT)
Dept: WOMENS IMAGING | Age: 60
Discharge: HOME | End: 2025-07-29
Payer: COMMERCIAL

## 2025-07-29 VITALS — BODY MASS INDEX: 34.86 KG/M2 | HEIGHT: 68 IN | WEIGHT: 230 LBS

## 2025-07-29 DIAGNOSIS — Z12.31 ENCOUNTER FOR SCREENING MAMMOGRAM FOR BREAST CANCER: ICD-10-CM

## 2025-07-29 PROCEDURE — 77063 BREAST TOMOSYNTHESIS BI: CPT

## 2025-08-08 DIAGNOSIS — I10 ESSENTIAL HYPERTENSION: ICD-10-CM

## 2025-08-08 RX ORDER — AMLODIPINE BESYLATE 10 MG/1
10 TABLET ORAL DAILY
Qty: 90 TABLET | Refills: 0 | Status: SHIPPED | OUTPATIENT
Start: 2025-08-08

## 2025-08-08 RX ORDER — TRIAMTERENE AND HYDROCHLOROTHIAZIDE 37.5; 25 MG/1; MG/1
1 TABLET ORAL DAILY
Qty: 90 TABLET | Refills: 0 | Status: SHIPPED | OUTPATIENT
Start: 2025-08-08

## (undated) DEVICE — FORCEPS BX 240CM 2.4MM L NDL RAD JAW 4 M00513334

## (undated) DEVICE — SNARES COLD OVAL 10MM THIN

## (undated) DEVICE — ENDOSCOPY KIT: Brand: MEDLINE INDUSTRIES, INC.

## (undated) DEVICE — TRAP POLYP ETRAP

## (undated) DEVICE — FORMALIN CLEAR VIAL 20 ML 10%